# Patient Record
Sex: FEMALE | Race: WHITE | NOT HISPANIC OR LATINO | URBAN - METROPOLITAN AREA
[De-identification: names, ages, dates, MRNs, and addresses within clinical notes are randomized per-mention and may not be internally consistent; named-entity substitution may affect disease eponyms.]

---

## 2017-01-08 ENCOUNTER — INPATIENT (INPATIENT)
Facility: HOSPITAL | Age: 82
LOS: 3 days | Discharge: NURSING, REHAB OR LONG-TERM CARE | DRG: 516 | End: 2017-01-12
Attending: INTERNAL MEDICINE | Admitting: INTERNAL MEDICINE
Payer: MEDICARE

## 2017-01-08 VITALS
RESPIRATION RATE: 19 BRPM | SYSTOLIC BLOOD PRESSURE: 151 MMHG | WEIGHT: 156.97 LBS | DIASTOLIC BLOOD PRESSURE: 90 MMHG | TEMPERATURE: 98 F | HEART RATE: 61 BPM | OXYGEN SATURATION: 99 %

## 2017-01-08 DIAGNOSIS — Z29.9 ENCOUNTER FOR PROPHYLACTIC MEASURES, UNSPECIFIED: ICD-10-CM
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
## 2017-01-08 DIAGNOSIS — S82.002A UNSPECIFIED FRACTURE OF LEFT PATELLA, INITIAL ENCOUNTER FOR CLOSED FRACTURE: ICD-10-CM

## 2017-01-08 DIAGNOSIS — Z86.79 PERSONAL HISTORY OF OTHER DISEASES OF THE CIRCULATORY SYSTEM: ICD-10-CM

## 2017-01-08 DIAGNOSIS — E03.9 HYPOTHYROIDISM, UNSPECIFIED: ICD-10-CM

## 2017-01-08 DIAGNOSIS — S52.502A UNSPECIFIED FRACTURE OF THE LOWER END OF LEFT RADIUS, INITIAL ENCOUNTER FOR CLOSED FRACTURE: ICD-10-CM

## 2017-01-08 DIAGNOSIS — E78.00 PURE HYPERCHOLESTEROLEMIA, UNSPECIFIED: ICD-10-CM

## 2017-01-08 DIAGNOSIS — M25.562 PAIN IN LEFT KNEE: ICD-10-CM

## 2017-01-08 LAB
ALBUMIN SERPL ELPH-MCNC: 3.5 G/DL — SIGNIFICANT CHANGE UP (ref 3.5–5)
ALP SERPL-CCNC: 70 U/L — SIGNIFICANT CHANGE UP (ref 40–120)
ALT FLD-CCNC: 16 U/L DA — SIGNIFICANT CHANGE UP (ref 10–60)
ANION GAP SERPL CALC-SCNC: 10 MMOL/L — SIGNIFICANT CHANGE UP (ref 5–17)
APPEARANCE UR: CLEAR — SIGNIFICANT CHANGE UP
AST SERPL-CCNC: 13 U/L — SIGNIFICANT CHANGE UP (ref 10–40)
BASOPHILS # BLD AUTO: 0.1 K/UL — SIGNIFICANT CHANGE UP (ref 0–0.2)
BASOPHILS NFR BLD AUTO: 1.2 % — SIGNIFICANT CHANGE UP (ref 0–2)
BILIRUB SERPL-MCNC: 0.7 MG/DL — SIGNIFICANT CHANGE UP (ref 0.2–1.2)
BILIRUB UR-MCNC: NEGATIVE — SIGNIFICANT CHANGE UP
BUN SERPL-MCNC: 18 MG/DL — SIGNIFICANT CHANGE UP (ref 7–18)
CALCIUM SERPL-MCNC: 7.9 MG/DL — LOW (ref 8.4–10.5)
CHLORIDE SERPL-SCNC: 105 MMOL/L — SIGNIFICANT CHANGE UP (ref 96–108)
CO2 SERPL-SCNC: 28 MMOL/L — SIGNIFICANT CHANGE UP (ref 22–31)
COLOR SPEC: YELLOW — SIGNIFICANT CHANGE UP
CREAT SERPL-MCNC: 0.93 MG/DL — SIGNIFICANT CHANGE UP (ref 0.5–1.3)
DIFF PNL FLD: ABNORMAL
EOSINOPHIL # BLD AUTO: 0.2 K/UL — SIGNIFICANT CHANGE UP (ref 0–0.5)
EOSINOPHIL NFR BLD AUTO: 1.7 % — SIGNIFICANT CHANGE UP (ref 0–6)
GLUCOSE SERPL-MCNC: 109 MG/DL — HIGH (ref 70–99)
GLUCOSE UR QL: NEGATIVE — SIGNIFICANT CHANGE UP
HCT VFR BLD CALC: 38.6 % — SIGNIFICANT CHANGE UP (ref 34.5–45)
HGB BLD-MCNC: 12.7 G/DL — SIGNIFICANT CHANGE UP (ref 11.5–15.5)
KETONES UR-MCNC: NEGATIVE — SIGNIFICANT CHANGE UP
LEUKOCYTE ESTERASE UR-ACNC: ABNORMAL
LYMPHOCYTES # BLD AUTO: 1.3 K/UL — SIGNIFICANT CHANGE UP (ref 1–3.3)
LYMPHOCYTES # BLD AUTO: 14.3 % — SIGNIFICANT CHANGE UP (ref 13–44)
MCHC RBC-ENTMCNC: 29.1 PG — SIGNIFICANT CHANGE UP (ref 27–34)
MCHC RBC-ENTMCNC: 32.9 GM/DL — SIGNIFICANT CHANGE UP (ref 32–36)
MCV RBC AUTO: 88.5 FL — SIGNIFICANT CHANGE UP (ref 80–100)
MONOCYTES # BLD AUTO: 0.8 K/UL — SIGNIFICANT CHANGE UP (ref 0–0.9)
MONOCYTES NFR BLD AUTO: 8.3 % — SIGNIFICANT CHANGE UP (ref 2–14)
NEUTROPHILS # BLD AUTO: 6.8 K/UL — SIGNIFICANT CHANGE UP (ref 1.8–7.4)
NEUTROPHILS NFR BLD AUTO: 74.5 % — SIGNIFICANT CHANGE UP (ref 43–77)
NITRITE UR-MCNC: NEGATIVE — SIGNIFICANT CHANGE UP
PH UR: 6.5 — SIGNIFICANT CHANGE UP (ref 4.8–8)
PLATELET # BLD AUTO: 213 K/UL — SIGNIFICANT CHANGE UP (ref 150–400)
POTASSIUM SERPL-MCNC: 3.7 MMOL/L — SIGNIFICANT CHANGE UP (ref 3.5–5.3)
POTASSIUM SERPL-SCNC: 3.7 MMOL/L — SIGNIFICANT CHANGE UP (ref 3.5–5.3)
PROT SERPL-MCNC: 7.5 G/DL — SIGNIFICANT CHANGE UP (ref 6–8.3)
PROT UR-MCNC: 30 MG/DL
RBC # BLD: 4.36 M/UL — SIGNIFICANT CHANGE UP (ref 3.8–5.2)
RBC # FLD: 13.8 % — SIGNIFICANT CHANGE UP (ref 10.3–14.5)
SODIUM SERPL-SCNC: 143 MMOL/L — SIGNIFICANT CHANGE UP (ref 135–145)
SP GR SPEC: 1.01 — SIGNIFICANT CHANGE UP (ref 1.01–1.02)
UROBILINOGEN FLD QL: NEGATIVE — SIGNIFICANT CHANGE UP
WBC # BLD: 9.1 K/UL — SIGNIFICANT CHANGE UP (ref 3.8–10.5)
WBC # FLD AUTO: 9.1 K/UL — SIGNIFICANT CHANGE UP (ref 3.8–10.5)

## 2017-01-08 PROCEDURE — 73564 X-RAY EXAM KNEE 4 OR MORE: CPT | Mod: 26,LT

## 2017-01-08 PROCEDURE — 71010: CPT | Mod: 26

## 2017-01-08 PROCEDURE — 99285 EMERGENCY DEPT VISIT HI MDM: CPT | Mod: 25

## 2017-01-08 PROCEDURE — 73110 X-RAY EXAM OF WRIST: CPT | Mod: 26,LT

## 2017-01-08 PROCEDURE — 72170 X-RAY EXAM OF PELVIS: CPT | Mod: 26

## 2017-01-08 RX ORDER — METOPROLOL TARTRATE 50 MG
50 TABLET ORAL
Qty: 0 | Refills: 0 | COMMUNITY

## 2017-01-08 RX ORDER — MONTELUKAST 4 MG/1
0 TABLET, CHEWABLE ORAL
Qty: 0 | Refills: 0 | COMMUNITY

## 2017-01-08 RX ORDER — ALPRAZOLAM 0.25 MG
0 TABLET ORAL
Qty: 0 | Refills: 0 | COMMUNITY

## 2017-01-08 RX ORDER — ALPRAZOLAM 0.25 MG
0.5 TABLET ORAL
Qty: 0 | Refills: 0 | Status: DISCONTINUED | OUTPATIENT
Start: 2017-01-08 | End: 2017-01-08

## 2017-01-08 RX ORDER — LEVOTHYROXINE SODIUM 125 MCG
100 TABLET ORAL DAILY
Qty: 0 | Refills: 0 | Status: DISCONTINUED | OUTPATIENT
Start: 2017-01-08 | End: 2017-01-12

## 2017-01-08 RX ORDER — LISINOPRIL 2.5 MG/1
40 TABLET ORAL DAILY
Qty: 0 | Refills: 0 | Status: DISCONTINUED | OUTPATIENT
Start: 2017-01-08 | End: 2017-01-12

## 2017-01-08 RX ORDER — METOPROLOL TARTRATE 50 MG
0 TABLET ORAL
Qty: 0 | Refills: 0 | COMMUNITY

## 2017-01-08 RX ORDER — ENOXAPARIN SODIUM 100 MG/ML
40 INJECTION SUBCUTANEOUS DAILY
Qty: 0 | Refills: 0 | Status: DISCONTINUED | OUTPATIENT
Start: 2017-01-08 | End: 2017-01-12

## 2017-01-08 RX ORDER — ATORVASTATIN CALCIUM 80 MG/1
0 TABLET, FILM COATED ORAL
Qty: 0 | Refills: 0 | COMMUNITY

## 2017-01-08 RX ORDER — LEVOTHYROXINE SODIUM 125 MCG
0 TABLET ORAL
Qty: 0 | Refills: 0 | COMMUNITY

## 2017-01-08 RX ORDER — ALPRAZOLAM 0.25 MG
60 TABLET ORAL
Qty: 0 | Refills: 0 | COMMUNITY

## 2017-01-08 RX ORDER — ATORVASTATIN CALCIUM 80 MG/1
20 TABLET, FILM COATED ORAL AT BEDTIME
Qty: 0 | Refills: 0 | Status: DISCONTINUED | OUTPATIENT
Start: 2017-01-08 | End: 2017-01-12

## 2017-01-08 RX ORDER — ACETAMINOPHEN 500 MG
650 TABLET ORAL ONCE
Qty: 0 | Refills: 0 | Status: COMPLETED | OUTPATIENT
Start: 2017-01-08 | End: 2017-01-08

## 2017-01-08 RX ORDER — ACETAMINOPHEN 500 MG
650 TABLET ORAL EVERY 6 HOURS
Qty: 0 | Refills: 0 | Status: DISCONTINUED | OUTPATIENT
Start: 2017-01-08 | End: 2017-01-12

## 2017-01-08 RX ORDER — DEXTROSE MONOHYDRATE, SODIUM CHLORIDE, AND POTASSIUM CHLORIDE 50; .745; 4.5 G/1000ML; G/1000ML; G/1000ML
1000 INJECTION, SOLUTION INTRAVENOUS
Qty: 0 | Refills: 0 | Status: DISCONTINUED | OUTPATIENT
Start: 2017-01-08 | End: 2017-01-12

## 2017-01-08 RX ADMIN — Medication 100 MICROGRAM(S): at 18:27

## 2017-01-08 RX ADMIN — LISINOPRIL 40 MILLIGRAM(S): 2.5 TABLET ORAL at 18:27

## 2017-01-08 RX ADMIN — Medication 0.5 MILLIGRAM(S): at 18:27

## 2017-01-08 RX ADMIN — Medication 0.5 MILLIGRAM(S): at 22:13

## 2017-01-08 RX ADMIN — Medication 650 MILLIGRAM(S): at 06:48

## 2017-01-08 RX ADMIN — ATORVASTATIN CALCIUM 20 MILLIGRAM(S): 80 TABLET, FILM COATED ORAL at 22:13

## 2017-01-08 RX ADMIN — ENOXAPARIN SODIUM 40 MILLIGRAM(S): 100 INJECTION SUBCUTANEOUS at 13:11

## 2017-01-08 NOTE — ED ADULT NURSE NOTE - OBJECTIVE STATEMENT
patient is a 82y Female complaining of fall. Pt is alert and oriented x 3, denies allergies to food or medication.

## 2017-01-08 NOTE — ED PROVIDER NOTE - MEDICAL DECISION MAKING DETAILS
83 yo F with mech slip and fall on snow 12 hours prior to arrival now with left wrist and knee pain.   Will obtain imaging, provide meds and reassess. Will most likely need admission and PT.   PMD Dr Naranjo. 83 [facility] to arrival now with left wrist and knee pain.   Will obtain imaging, provide meds and reassess. Will most likely need admission and PT.   Distal Rad Fx Left and Patellar Fx left. Will admit to Dr Naranjo Service.   PMD Dr Naranjo.

## 2017-01-08 NOTE — H&P ADULT. - PROBLEM SELECTOR PLAN 2
- will continue pain management   - continue fall precautions   - f/u orthopedic evaluation, Dr Moreira - will continue pain management   - continue fall precautions   - f/u orthopedic evaluation, Dr Moreira  - ORIF recommended

## 2017-01-08 NOTE — ED PROVIDER NOTE - CARE PLAN
Principal Discharge DX:	Fall, initial encounter  Secondary Diagnosis:	Wrist pain, acute, left  Secondary Diagnosis:	Acute pain of left knee

## 2017-01-08 NOTE — ED PROVIDER NOTE - DIAGNOSIS COUNSELING, MDM
conducted a detailed discussion... I had a detailed discussion with the patient regarding the historical points, exam findings, and any diagnostic results supporting the diagnosis.

## 2017-01-08 NOTE — H&P ADULT. - HISTORY OF PRESENT ILLNESS
83 yo F with HTN, HLP that lives at home that presents with fall 12 hours prior to arrival while shoveling snow falling on her left side, no head trauma, no LOC. Denies dizziness or chest pain prior to incident. Reports she was attempting to clear some snow from her sidewalk when slip and fell onto left side. Did not come to ED immediately thinking it was going to be okay, ACE wrap applied by her. Since has not taken meds but noticed pain and swelling to left wrist and left knee and inability to bear weight secondary to pain. Denies paresthesias, incontinence, pelvic or back pain. No SON, chest pain, abd pain. No other complaints. Patient is 81 yo F, lives alone, independent at baseline,  with HTN, HLP, hypothyroidism and anxiety presented with a fall, after trying to shovel snow.   Patient comes with fall 12 hours prior to arrival while shoveling snow falling on her left side, no head trauma, no LOC. Denies dizziness or chest pain prior to incident. Reports she was attempting to clear some snow from her sidewalk when slip and fell onto left side. Did not come to ED immediately thinking it was going to be okay, ACE wrap applied by her. Since has not taken meds but noticed pain and swelling to left wrist and left knee and inability to bear weight secondary to pain. Denies paresthesias, incontinence, pelvic or back pain. No SON, chest pain, abd pain. No other complaints. She has a flu shot this season and denies any URIs, chest pain, N/V/D or abdominal pain.     Social: lives alone.  and Son are both no more. Nephew Gifty is the HCP.  Never smoked, took alcohol or used illicit drugs.   Family: father had a stroke at 45 years of age.

## 2017-01-08 NOTE — ED PROVIDER NOTE - OBJECTIVE STATEMENT
83 yo F with HTN, HLP that lives at home that presents with fall 12 hours prior to arrival while shoveling snow falling on her left side, no head trauma, no LOC. Denies dizziness or chest pain prior to incident. Reports she was attempting to clear some snow from her sidewalk when slip and fell onto left side. Did not come to ED immediately thinking it was going to be okay, ACE wrap applied by her. Since has not taken meds but noticed pain and swelling to left wrist and left knee and inability to bear weight secondary to pain. Denies paresthesias, incontinence, pelvic or back pain. No SON, chest pain, abd pain. No other complaints.  PMD Dr Nraanjo

## 2017-01-08 NOTE — H&P ADULT. - ASSESSMENT
Patient is 83 yo F, lives alone, independent at baseline,  with HTN, HLP, hypothyroidism and anxiety presented with a fall, after trying to shovel snow.

## 2017-01-08 NOTE — ED PROVIDER NOTE - PHYSICAL EXAMINATION
GENERAL: No acute distress, non toxic  HEAD: Atraumatic, normocephalic  EARS: Externally normal, atraumatic, TMs normal bilaterally  EYES: No jaundice, not injected, no rupture, no foreign bodies  MOUTH: Moist mucous membranes, no open lesion, uvula midline without edema, no exudates, no peritonsilar abscess bilaterally.  NECK: Supple, full range of motion, no swelling, no lymphadenopathy  HEART: Regular rate and rhythm, no murmurs, no rubs, no gallops  LUNGS: Clear to auscultation bilaterally without rhonci, rales, or wheezing  ABDOMEN: Soft and non tender in all 4 quadrants, normal bowel sounds, no signs of trauma, no costovertebral tenderness bilaterally  BACK/SPINE: Non tender spine in cervical/thoracic/lumbar regions, no stepoffs palpable, pelvis stable to axial loading.   EXTREMITIES: left wrist with edema and ecchymosis, no open wounds, grossly deformed. Pulse palpable, cap refill < 2 secs. Left knee with swelling and tenderness to palpation medially, no open wounds pulses palpable DP and pop.   VASCULAR: Pulses palpable in all extremities, no pitting edema, capillary refill <2 secs  PSYCH: Alert and oriented x 3

## 2017-01-08 NOTE — H&P ADULT. - PROBLEM SELECTOR PLAN 1
-Patient has a history of falling without syncope, near-syncope, seizure-like activity, and no palpitations, diaphoresis, headaches, or visual disturbance prior to or after falling. He has comfortable foot wear and says her apartment has no slippery  floors.    - Most likely mechanical in nature, as patient is advised to ambulate at home with her walker; she does not always ambulate with her walker as she think her "mind runs ahead of her body"    - Consider hypoglycaemia-induced, as patient's FSBS low, 80 and patient feels weak with bilateral hand tremours and nausea after falling. Perhaps patient has episode of hypoglycaemia that have not been picked up upon FSBS monitoring.    - No significant evidence to indicate that falls are secondary to infection or dementia/delirium.    - Patient has had previous EKGs, however she has had no further cardiac evaluation, as per patient.    - No telemetry required upon this admission, as per attending (at this time), however f/u EKG for evidence of cardiac arrhythmias.    - F/u vitamin D-25(OH)2, vitamin B12, HgA1c, TSH and PT evaluation.    - F/u CPK level for concern for rhabdomyolysis  - No evidence of active bleedingPatient -Patient has a fall without syncope, near-syncope, seizure-like activity, and no palpitations, diaphoresis, headaches, or visual disturbance prior to or after falling. He has comfortable foot wear and says her apartment has no slippery  floors.  - Most likely mechanical in nature, whole shovelling in snow   - F/u vitamin D-25(OH)2, vitamin B12, HgA1c, TSH and PT evaluation.  - F/u CPK level for concern for rhabdomyolysis  - No evidence of active bleeding  - X ray wrist shows Comminuted distal left radial intra-articular fracture   deformity while X ray of the knee shows patellar fracture   - patient's pain is controlled on Tylenol   - will continue pain management   - continue fall precautions   - f/u orthopedic evaluation, Dr Moreira -Patient has a fall without syncope, near-syncope, seizure-like activity, and no palpitations, diaphoresis, headaches, or visual disturbance prior to or after falling. He has comfortable foot wear and says her apartment has no slippery  floors.  - Most likely mechanical in nature, whole shovelling in snow   - F/u vitamin D-25(OH)2, vitamin B12, HgA1c, TSH and PT evaluation.  - F/u CPK level for concern for rhabdomyolysis  - No evidence of active bleeding  - X ray wrist shows Comminuted distal left radial intra-articular fracture   deformity while X ray of the knee shows patellar fracture   - patient's pain is controlled on Tylenol   - will continue pain management   - continue fall precautions   - f/u orthopedic evaluation, Dr Moreira  ADDENDUM: patient would require ORIF of patella, hence Dr Ramirez was informed for cardiac clearance, Procedure likely tomorrow. NO coffee, peas, etc in am for possible stress test. -Patient has a fall without syncope, near-syncope, seizure-like activity, and no palpitations, diaphoresis, headaches, or visual disturbance prior to or after falling. He has comfortable foot wear and says her apartment has no slippery  floors.  - Most likely mechanical in nature, whole shovelling in snow   - F/u vitamin D-25(OH)2, vitamin B12, HgA1c, TSH and PT evaluation.  - F/u CPK level for concern for rhabdomyolysis  - No evidence of active bleeding  - X ray wrist shows Comminuted distal left radial intra-articular fracture   deformity while X ray of the knee shows patellar fracture   - patient's pain is controlled on Tylenol   - will continue pain management   - continue fall precautions   - f/u orthopedic evaluation, Dr Moreira  ADDENDUM: patient would require ORIF of patella, hence Dr Ramirez was informed for cardiac clearance, Procedure likely tomorrow. Stress test in am.

## 2017-01-08 NOTE — H&P ADULT. - PROBLEM SELECTOR PLAN 3
- will continue pain management   - continue fall precautions   - f/u orthopedic evaluation, Dr Moreira - will continue pain management   - continue fall precautions   - f/u orthopedic evaluation, Dr Moreira  - plaster and PT

## 2017-01-08 NOTE — H&P ADULT. - PROBLEM SELECTOR PLAN 4
- will continue pain management   - continue fall precautions   - f/u orthopedic evaluation, Dr Moreira- will continue pain management   - continue fall precautions   - f/u orthopedic evaluation, Dr Moreira

## 2017-01-09 LAB
24R-OH-CALCIDIOL SERPL-MCNC: 40.9 NG/ML — SIGNIFICANT CHANGE UP (ref 30–100)
ALBUMIN SERPL ELPH-MCNC: 3 G/DL — LOW (ref 3.5–5)
ALP SERPL-CCNC: 64 U/L — SIGNIFICANT CHANGE UP (ref 40–120)
ALT FLD-CCNC: 12 U/L DA — SIGNIFICANT CHANGE UP (ref 10–60)
ANION GAP SERPL CALC-SCNC: 10 MMOL/L — SIGNIFICANT CHANGE UP (ref 5–17)
AST SERPL-CCNC: 12 U/L — SIGNIFICANT CHANGE UP (ref 10–40)
BASOPHILS # BLD AUTO: 0.1 K/UL — SIGNIFICANT CHANGE UP (ref 0–0.2)
BASOPHILS NFR BLD AUTO: 1.1 % — SIGNIFICANT CHANGE UP (ref 0–2)
BILIRUB SERPL-MCNC: 0.8 MG/DL — SIGNIFICANT CHANGE UP (ref 0.2–1.2)
BUN SERPL-MCNC: 13 MG/DL — SIGNIFICANT CHANGE UP (ref 7–18)
CALCIUM SERPL-MCNC: 6.8 MG/DL — LOW (ref 8.4–10.5)
CHLORIDE SERPL-SCNC: 107 MMOL/L — SIGNIFICANT CHANGE UP (ref 96–108)
CHOLEST SERPL-MCNC: 170 MG/DL — SIGNIFICANT CHANGE UP (ref 10–199)
CO2 SERPL-SCNC: 28 MMOL/L — SIGNIFICANT CHANGE UP (ref 22–31)
CREAT SERPL-MCNC: 0.75 MG/DL — SIGNIFICANT CHANGE UP (ref 0.5–1.3)
EOSINOPHIL # BLD AUTO: 0.3 K/UL — SIGNIFICANT CHANGE UP (ref 0–0.5)
EOSINOPHIL NFR BLD AUTO: 3.1 % — SIGNIFICANT CHANGE UP (ref 0–6)
GLUCOSE SERPL-MCNC: 113 MG/DL — HIGH (ref 70–99)
HBA1C BLD-MCNC: 6.2 % — HIGH (ref 4–5.6)
HCT VFR BLD CALC: 36.6 % — SIGNIFICANT CHANGE UP (ref 34.5–45)
HDLC SERPL-MCNC: 93 MG/DL — SIGNIFICANT CHANGE UP (ref 40–125)
HGB BLD-MCNC: 12 G/DL — SIGNIFICANT CHANGE UP (ref 11.5–15.5)
LIPID PNL WITH DIRECT LDL SERPL: 62 MG/DL — SIGNIFICANT CHANGE UP
LYMPHOCYTES # BLD AUTO: 1.5 K/UL — SIGNIFICANT CHANGE UP (ref 1–3.3)
LYMPHOCYTES # BLD AUTO: 18 % — SIGNIFICANT CHANGE UP (ref 13–44)
MAGNESIUM SERPL-MCNC: 2.2 MG/DL — SIGNIFICANT CHANGE UP (ref 1.8–2.4)
MCHC RBC-ENTMCNC: 28.3 PG — SIGNIFICANT CHANGE UP (ref 27–34)
MCHC RBC-ENTMCNC: 32.7 GM/DL — SIGNIFICANT CHANGE UP (ref 32–36)
MCV RBC AUTO: 86.7 FL — SIGNIFICANT CHANGE UP (ref 80–100)
MONOCYTES # BLD AUTO: 0.8 K/UL — SIGNIFICANT CHANGE UP (ref 0–0.9)
MONOCYTES NFR BLD AUTO: 9.8 % — SIGNIFICANT CHANGE UP (ref 2–14)
NEUTROPHILS # BLD AUTO: 5.5 K/UL — SIGNIFICANT CHANGE UP (ref 1.8–7.4)
NEUTROPHILS NFR BLD AUTO: 67.9 % — SIGNIFICANT CHANGE UP (ref 43–77)
PHOSPHATE SERPL-MCNC: 5 MG/DL — HIGH (ref 2.5–4.5)
PLATELET # BLD AUTO: 189 K/UL — SIGNIFICANT CHANGE UP (ref 150–400)
POTASSIUM SERPL-MCNC: 3.5 MMOL/L — SIGNIFICANT CHANGE UP (ref 3.5–5.3)
POTASSIUM SERPL-SCNC: 3.5 MMOL/L — SIGNIFICANT CHANGE UP (ref 3.5–5.3)
PROT SERPL-MCNC: 6.9 G/DL — SIGNIFICANT CHANGE UP (ref 6–8.3)
RBC # BLD: 4.22 M/UL — SIGNIFICANT CHANGE UP (ref 3.8–5.2)
RBC # FLD: 13.5 % — SIGNIFICANT CHANGE UP (ref 10.3–14.5)
SODIUM SERPL-SCNC: 145 MMOL/L — SIGNIFICANT CHANGE UP (ref 135–145)
TOTAL CHOLESTEROL/HDL RATIO MEASUREMENT: 1.8 RATIO — LOW (ref 3.3–7.1)
TRIGL SERPL-MCNC: 75 MG/DL — SIGNIFICANT CHANGE UP (ref 10–149)
TSH SERPL-MCNC: 1.59 UU/ML — SIGNIFICANT CHANGE UP (ref 0.34–4.82)
VIT B12 SERPL-MCNC: 904 PG/ML — HIGH (ref 243–894)
WBC # BLD: 8.1 K/UL — SIGNIFICANT CHANGE UP (ref 3.8–10.5)
WBC # FLD AUTO: 8.1 K/UL — SIGNIFICANT CHANGE UP (ref 3.8–10.5)

## 2017-01-09 PROCEDURE — 27524 TREAT KNEECAP FRACTURE: CPT | Mod: AS,LT

## 2017-01-09 RX ORDER — KETOROLAC TROMETHAMINE 30 MG/ML
15 SYRINGE (ML) INJECTION EVERY 6 HOURS
Qty: 0 | Refills: 0 | Status: DISCONTINUED | OUTPATIENT
Start: 2017-01-09 | End: 2017-01-11

## 2017-01-09 RX ORDER — SODIUM CHLORIDE 9 MG/ML
1000 INJECTION, SOLUTION INTRAVENOUS
Qty: 0 | Refills: 0 | Status: DISCONTINUED | OUTPATIENT
Start: 2017-01-09 | End: 2017-01-12

## 2017-01-09 RX ORDER — FENTANYL CITRATE 50 UG/ML
25 INJECTION INTRAVENOUS
Qty: 0 | Refills: 0 | Status: DISCONTINUED | OUTPATIENT
Start: 2017-01-09 | End: 2017-01-09

## 2017-01-09 RX ORDER — METOPROLOL TARTRATE 50 MG
25 TABLET ORAL
Qty: 0 | Refills: 0 | Status: DISCONTINUED | OUTPATIENT
Start: 2017-01-09 | End: 2017-01-12

## 2017-01-09 RX ORDER — SODIUM CHLORIDE 9 MG/ML
1000 INJECTION, SOLUTION INTRAVENOUS
Qty: 0 | Refills: 0 | Status: DISCONTINUED | OUTPATIENT
Start: 2017-01-09 | End: 2017-01-09

## 2017-01-09 RX ORDER — CEFAZOLIN SODIUM 1 G
1000 VIAL (EA) INJECTION EVERY 8 HOURS
Qty: 0 | Refills: 0 | Status: COMPLETED | OUTPATIENT
Start: 2017-01-09 | End: 2017-01-10

## 2017-01-09 RX ORDER — ACETAMINOPHEN 500 MG
1000 TABLET ORAL ONCE
Qty: 0 | Refills: 0 | Status: COMPLETED | OUTPATIENT
Start: 2017-01-09 | End: 2017-01-09

## 2017-01-09 RX ADMIN — Medication 400 MILLIGRAM(S): at 21:35

## 2017-01-09 RX ADMIN — Medication 1000 MILLIGRAM(S): at 21:40

## 2017-01-09 RX ADMIN — LISINOPRIL 40 MILLIGRAM(S): 2.5 TABLET ORAL at 06:04

## 2017-01-09 RX ADMIN — ATORVASTATIN CALCIUM 20 MILLIGRAM(S): 80 TABLET, FILM COATED ORAL at 21:30

## 2017-01-09 RX ADMIN — Medication 0.5 MILLIGRAM(S): at 23:13

## 2017-01-09 RX ADMIN — Medication 100 MILLIGRAM(S): at 21:29

## 2017-01-09 RX ADMIN — DEXTROSE MONOHYDRATE, SODIUM CHLORIDE, AND POTASSIUM CHLORIDE 75 MILLILITER(S): 50; .745; 4.5 INJECTION, SOLUTION INTRAVENOUS at 06:05

## 2017-01-09 RX ADMIN — Medication 25 MILLIGRAM(S): at 21:30

## 2017-01-09 RX ADMIN — Medication 100 MICROGRAM(S): at 06:04

## 2017-01-10 LAB
HCT VFR BLD CALC: 30.7 % — LOW (ref 34.5–45)
HGB BLD-MCNC: 10.1 G/DL — LOW (ref 11.5–15.5)
MCHC RBC-ENTMCNC: 29.3 PG — SIGNIFICANT CHANGE UP (ref 27–34)
MCHC RBC-ENTMCNC: 32.8 GM/DL — SIGNIFICANT CHANGE UP (ref 32–36)
MCV RBC AUTO: 89.2 FL — SIGNIFICANT CHANGE UP (ref 80–100)
PLATELET # BLD AUTO: 168 K/UL — SIGNIFICANT CHANGE UP (ref 150–400)
RBC # BLD: 3.44 M/UL — LOW (ref 3.8–5.2)
RBC # FLD: 14 % — SIGNIFICANT CHANGE UP (ref 10.3–14.5)
WBC # BLD: 10 K/UL — SIGNIFICANT CHANGE UP (ref 3.8–10.5)
WBC # FLD AUTO: 10 K/UL — SIGNIFICANT CHANGE UP (ref 3.8–10.5)

## 2017-01-10 RX ADMIN — Medication 100 MILLIGRAM(S): at 06:53

## 2017-01-10 RX ADMIN — Medication 25 MILLIGRAM(S): at 17:22

## 2017-01-10 RX ADMIN — ENOXAPARIN SODIUM 40 MILLIGRAM(S): 100 INJECTION SUBCUTANEOUS at 11:47

## 2017-01-10 RX ADMIN — Medication 25 MILLIGRAM(S): at 06:54

## 2017-01-10 RX ADMIN — Medication 15 MILLIGRAM(S): at 06:31

## 2017-01-10 RX ADMIN — ATORVASTATIN CALCIUM 20 MILLIGRAM(S): 80 TABLET, FILM COATED ORAL at 21:22

## 2017-01-10 RX ADMIN — Medication 15 MILLIGRAM(S): at 23:00

## 2017-01-10 RX ADMIN — Medication 0.5 MILLIGRAM(S): at 13:52

## 2017-01-10 RX ADMIN — LISINOPRIL 40 MILLIGRAM(S): 2.5 TABLET ORAL at 06:54

## 2017-01-10 RX ADMIN — Medication 15 MILLIGRAM(S): at 06:16

## 2017-01-10 RX ADMIN — Medication 100 MICROGRAM(S): at 06:53

## 2017-01-10 RX ADMIN — Medication 15 MILLIGRAM(S): at 22:26

## 2017-01-10 NOTE — PHYSICAL THERAPY INITIAL EVALUATION ADULT - LIVES WITH, PROFILE
alone/lives alone in house with 11 steps outside to enter as per pt. Pt. reports eating in basement and having bedroom upstairs. lives alone in house with 11 steps outside to enter. Pt. reports eating in basement and having bedroom upstairs./alone

## 2017-01-10 NOTE — PHYSICAL THERAPY INITIAL EVALUATION ADULT - PERTINENT HX OF CURRENT PROBLEM, REHAB EVAL
fall w/fracture of L patella and L distal radius fall w/fracture of L patella and ORIF and L distal radius

## 2017-01-10 NOTE — PHYSICAL THERAPY INITIAL EVALUATION ADULT - NS ASR WT BEARING DETAIL LUE
nonweight-bearing Ortho PA advised not to use platform walker at this time secondary to risk of lt. radius fx. displacement/nonweight-bearing

## 2017-01-10 NOTE — PHYSICAL THERAPY INITIAL EVALUATION ADULT - PASSIVE RANGE OF MOTION EXAMINATION, REHAB EVAL
LUE shoulder to ~90 deg flx. Lt. hip WFL.  lt. knee not assessed as braced. Lt. ankle WFL/Right UE Passive ROM was WFL (within functional limits)/Right LE Passive ROM was WFL (within functional limits)

## 2017-01-10 NOTE — PHYSICAL THERAPY INITIAL EVALUATION ADULT - TRANSFER SAFETY CONCERNS NOTED: SIT/STAND, REHAB EVAL
decreased step length/decreased balance during turns/decreased weight-shifting ability/inability to maintain weight-bearing restrictions w/o assist

## 2017-01-10 NOTE — PHYSICAL THERAPY INITIAL EVALUATION ADULT - GENERAL OBSERVATIONS, REHAB EVAL
Consult received, chart reviewed. Patient received supine in bed, NAD, + tele, +IV. Pt. L knee in ace wrap locked in ext. w/knee immobilizer. Pt. L wrist/elbow in ace wrap with olecranon splint. Patient agreed to EVALUATION from Physical Therapist.

## 2017-01-10 NOTE — PHYSICAL THERAPY INITIAL EVALUATION ADULT - ACTIVE RANGE OF MOTION EXAMINATION, REHAB EVAL
AROM L shoulder to 90 degrees, elbow ext/flex limited secondary to splint, wrist ROM not tested.  L LE AROM not assessed secondary to being in knee immobilizer locked in knee ext. except L ankle AROM which is WFL

## 2017-01-10 NOTE — PHYSICAL THERAPY INITIAL EVALUATION ADULT - MANUAL MUSCLE TESTING RESULTS, REHAB EVAL
R UE grossly 5/5, R LE grossly 5/5 except for hip flex 4/5. L shoulder 3/5, L elbow and L wrist not assessed secondary to being in ace wrap and splint with NWB precautions. L hip 3/5, L knee not assessed secondary to being in knee immobilizer, L ankle 5/5/grossly assessed due to grossly assessed due to/R UE grossly 5/5, R LE grossly 5/5 except for hip flex 4/5. L shoulder 3-/5, L elbow and L wrist not assessed secondary to being in ace wrap and splint with NWB precautions. L hip 3/5, L knee not assessed secondary to being in knee immobilizer, L ankle 5/5

## 2017-01-10 NOTE — PHYSICAL THERAPY INITIAL EVALUATION ADULT - CRITERIA FOR SKILLED THERAPEUTIC INTERVENTIONS
therapy frequency/predicted duration of therapy intervention/rehab potential/impairments found/anticipated discharge recommendation/functional limitations in following categories/risk reduction/prevention

## 2017-01-10 NOTE — PHYSICAL THERAPY INITIAL EVALUATION ADULT - LEVEL OF INDEPENDENCE: STAIR NEGOTIATION, REHAB EVAL
Not assessed at this time secondary to severe physical impairment which would place pt. at risk for fall. To be attempted as pt. appropriate

## 2017-01-11 LAB
ANION GAP SERPL CALC-SCNC: 9 MMOL/L — SIGNIFICANT CHANGE UP (ref 5–17)
BUN SERPL-MCNC: 11 MG/DL — SIGNIFICANT CHANGE UP (ref 7–18)
CALCIUM SERPL-MCNC: 6.2 MG/DL — CRITICAL LOW (ref 8.4–10.5)
CHLORIDE SERPL-SCNC: 107 MMOL/L — SIGNIFICANT CHANGE UP (ref 96–108)
CO2 SERPL-SCNC: 28 MMOL/L — SIGNIFICANT CHANGE UP (ref 22–31)
CREAT SERPL-MCNC: 0.74 MG/DL — SIGNIFICANT CHANGE UP (ref 0.5–1.3)
GLUCOSE SERPL-MCNC: 143 MG/DL — HIGH (ref 70–99)
HCT VFR BLD CALC: 31 % — LOW (ref 34.5–45)
HGB BLD-MCNC: 10 G/DL — LOW (ref 11.5–15.5)
MCHC RBC-ENTMCNC: 28.3 PG — SIGNIFICANT CHANGE UP (ref 27–34)
MCHC RBC-ENTMCNC: 32.3 GM/DL — SIGNIFICANT CHANGE UP (ref 32–36)
MCV RBC AUTO: 87.6 FL — SIGNIFICANT CHANGE UP (ref 80–100)
PLATELET # BLD AUTO: 193 K/UL — SIGNIFICANT CHANGE UP (ref 150–400)
POTASSIUM SERPL-MCNC: 3.4 MMOL/L — LOW (ref 3.5–5.3)
POTASSIUM SERPL-SCNC: 3.4 MMOL/L — LOW (ref 3.5–5.3)
RBC # BLD: 3.54 M/UL — LOW (ref 3.8–5.2)
RBC # FLD: 12.8 % — SIGNIFICANT CHANGE UP (ref 10.3–14.5)
SODIUM SERPL-SCNC: 144 MMOL/L — SIGNIFICANT CHANGE UP (ref 135–145)
WBC # BLD: 8.5 K/UL — SIGNIFICANT CHANGE UP (ref 3.8–10.5)
WBC # FLD AUTO: 8.5 K/UL — SIGNIFICANT CHANGE UP (ref 3.8–10.5)

## 2017-01-11 PROCEDURE — 73100 X-RAY EXAM OF WRIST: CPT | Mod: 26,LT

## 2017-01-11 RX ORDER — POTASSIUM CHLORIDE 20 MEQ
40 PACKET (EA) ORAL EVERY 4 HOURS
Qty: 0 | Refills: 0 | Status: COMPLETED | OUTPATIENT
Start: 2017-01-11 | End: 2017-01-11

## 2017-01-11 RX ORDER — POTASSIUM CHLORIDE 20 MEQ
40 PACKET (EA) ORAL ONCE
Qty: 0 | Refills: 0 | Status: COMPLETED | OUTPATIENT
Start: 2017-01-11 | End: 2017-01-11

## 2017-01-11 RX ORDER — TRAMADOL HYDROCHLORIDE 50 MG/1
50 TABLET ORAL EVERY 6 HOURS
Qty: 0 | Refills: 0 | Status: DISCONTINUED | OUTPATIENT
Start: 2017-01-11 | End: 2017-01-12

## 2017-01-11 RX ORDER — CALCIUM GLUCONATE 100 MG/ML
1 VIAL (ML) INTRAVENOUS ONCE
Qty: 0 | Refills: 0 | Status: COMPLETED | OUTPATIENT
Start: 2017-01-11 | End: 2017-01-11

## 2017-01-11 RX ADMIN — TRAMADOL HYDROCHLORIDE 50 MILLIGRAM(S): 50 TABLET ORAL at 18:57

## 2017-01-11 RX ADMIN — TRAMADOL HYDROCHLORIDE 50 MILLIGRAM(S): 50 TABLET ORAL at 17:57

## 2017-01-11 RX ADMIN — Medication 40 MILLIEQUIVALENT(S): at 14:07

## 2017-01-11 RX ADMIN — Medication 25 MILLIGRAM(S): at 17:56

## 2017-01-11 RX ADMIN — TRAMADOL HYDROCHLORIDE 50 MILLIGRAM(S): 50 TABLET ORAL at 12:13

## 2017-01-11 RX ADMIN — Medication 0.5 MILLIGRAM(S): at 01:28

## 2017-01-11 RX ADMIN — LISINOPRIL 40 MILLIGRAM(S): 2.5 TABLET ORAL at 06:01

## 2017-01-11 RX ADMIN — Medication 15 MILLIGRAM(S): at 06:32

## 2017-01-11 RX ADMIN — ATORVASTATIN CALCIUM 20 MILLIGRAM(S): 80 TABLET, FILM COATED ORAL at 21:33

## 2017-01-11 RX ADMIN — Medication 40 MILLIEQUIVALENT(S): at 11:13

## 2017-01-11 RX ADMIN — ENOXAPARIN SODIUM 40 MILLIGRAM(S): 100 INJECTION SUBCUTANEOUS at 11:13

## 2017-01-11 RX ADMIN — Medication 100 MICROGRAM(S): at 06:02

## 2017-01-11 RX ADMIN — Medication 25 MILLIGRAM(S): at 06:01

## 2017-01-11 RX ADMIN — Medication 15 MILLIGRAM(S): at 06:02

## 2017-01-11 RX ADMIN — TRAMADOL HYDROCHLORIDE 50 MILLIGRAM(S): 50 TABLET ORAL at 11:13

## 2017-01-11 RX ADMIN — Medication 0.5 MILLIGRAM(S): at 21:33

## 2017-01-11 RX ADMIN — Medication 40 MILLIEQUIVALENT(S): at 14:06

## 2017-01-11 RX ADMIN — Medication 200 GRAM(S): at 11:13

## 2017-01-11 RX ADMIN — Medication 1 TABLET(S): at 17:56

## 2017-01-12 VITALS
OXYGEN SATURATION: 98 % | HEART RATE: 69 BPM | TEMPERATURE: 99 F | SYSTOLIC BLOOD PRESSURE: 152 MMHG | DIASTOLIC BLOOD PRESSURE: 74 MMHG | RESPIRATION RATE: 16 BRPM

## 2017-01-12 PROCEDURE — 93306 TTE W/DOPPLER COMPLETE: CPT

## 2017-01-12 PROCEDURE — 80053 COMPREHEN METABOLIC PANEL: CPT

## 2017-01-12 PROCEDURE — 84100 ASSAY OF PHOSPHORUS: CPT

## 2017-01-12 PROCEDURE — 76000 FLUOROSCOPY <1 HR PHYS/QHP: CPT

## 2017-01-12 PROCEDURE — 82306 VITAMIN D 25 HYDROXY: CPT

## 2017-01-12 PROCEDURE — 99285 EMERGENCY DEPT VISIT HI MDM: CPT | Mod: 25

## 2017-01-12 PROCEDURE — 97162 PT EVAL MOD COMPLEX 30 MIN: CPT

## 2017-01-12 PROCEDURE — 73564 X-RAY EXAM KNEE 4 OR MORE: CPT

## 2017-01-12 PROCEDURE — 71045 X-RAY EXAM CHEST 1 VIEW: CPT

## 2017-01-12 PROCEDURE — 93005 ELECTROCARDIOGRAM TRACING: CPT

## 2017-01-12 PROCEDURE — 73100 X-RAY EXAM OF WRIST: CPT

## 2017-01-12 PROCEDURE — 73110 X-RAY EXAM OF WRIST: CPT

## 2017-01-12 PROCEDURE — 81001 URINALYSIS AUTO W/SCOPE: CPT

## 2017-01-12 PROCEDURE — 84443 ASSAY THYROID STIM HORMONE: CPT

## 2017-01-12 PROCEDURE — 78452 HT MUSCLE IMAGE SPECT MULT: CPT

## 2017-01-12 PROCEDURE — A9502: CPT

## 2017-01-12 PROCEDURE — 82607 VITAMIN B-12: CPT

## 2017-01-12 PROCEDURE — 72170 X-RAY EXAM OF PELVIS: CPT

## 2017-01-12 PROCEDURE — 93017 CV STRESS TEST TRACING ONLY: CPT

## 2017-01-12 PROCEDURE — 83735 ASSAY OF MAGNESIUM: CPT

## 2017-01-12 PROCEDURE — 85027 COMPLETE CBC AUTOMATED: CPT

## 2017-01-12 PROCEDURE — C1713: CPT

## 2017-01-12 PROCEDURE — 80048 BASIC METABOLIC PNL TOTAL CA: CPT

## 2017-01-12 PROCEDURE — 83036 HEMOGLOBIN GLYCOSYLATED A1C: CPT

## 2017-01-12 PROCEDURE — 80061 LIPID PANEL: CPT

## 2017-01-12 RX ORDER — TRAMADOL HYDROCHLORIDE 50 MG/1
1 TABLET ORAL
Qty: 0 | Refills: 0 | COMMUNITY
Start: 2017-01-12

## 2017-01-12 RX ADMIN — Medication 1 TABLET(S): at 06:20

## 2017-01-12 RX ADMIN — Medication 0.5 MILLIGRAM(S): at 10:15

## 2017-01-12 RX ADMIN — Medication 25 MILLIGRAM(S): at 06:20

## 2017-01-12 RX ADMIN — Medication 100 MICROGRAM(S): at 06:20

## 2017-01-12 RX ADMIN — ENOXAPARIN SODIUM 40 MILLIGRAM(S): 100 INJECTION SUBCUTANEOUS at 11:45

## 2017-01-12 RX ADMIN — LISINOPRIL 40 MILLIGRAM(S): 2.5 TABLET ORAL at 06:20

## 2017-01-12 NOTE — DISCHARGE NOTE ADULT - HOSPITAL COURSE
Patient comes with fall 12 hours prior to arrival while shoveling snow falling on her left side, no head trauma, no LOC. Denies dizziness or chest pain prior to incident. Reports she was attempting to clear some snow from her sidewalk when slip and fell onto left side. Did not come to ED immediately thinking it was going to be okay, ACE wrap applied by her. Since has not taken meds but noticed pain and swelling to left wrist and left knee and inability to bear weight secondary to pain. Denies paresthesias, incontinence, pelvic or back pain. No SON, chest pain, abd pain. No other complaints. She has a flu shot this season and denies any URIs, chest pain, N/V/D or abdominal pain.     Patient was noted to have patella fracture and distal radius fracture. Patient underwent L ORIF of patella and closed reduction of radius fracture. Patient tolerated procedure well. Patient seen by PT recommended for rehab. Patient for discharge with follow up with Dr. Fish within 2 weeks

## 2017-01-12 NOTE — DISCHARGE NOTE ADULT - PLAN OF CARE
NWB L upper extremity Please follow up with Dr. Moreira within 2 week   NWB L Upper extremity WBAT, ambulation Please follow up with Dr. Moreira within 2 week  staples to be removed by 1/25 Please continue current management and follow up with PCP

## 2017-01-12 NOTE — DISCHARGE NOTE ADULT - MEDICATION SUMMARY - MEDICATIONS TO STOP TAKING
I will STOP taking the medications listed below when I get home from the hospital:    fluconazole 150 mg oral tablet  -- 1 tab(s) by mouth once a week for 6 weeks  -- Do not take this drug if you are pregnant.  Finish all this medication unless otherwise directed by prescriber.    ketoconazole topical 2% topical cream  -- Apply on skin to affected area 2 times a day  -- Check with your doctor before becoming pregnant.  For external use only.  Obtain medical advice before taking any non-prescription drugs as some may affect the action of this medication.    montelukast  --  by mouth

## 2017-01-12 NOTE — DISCHARGE NOTE ADULT - SECONDARY DIAGNOSIS.
Closed nondisplaced fracture of left patella, unspecified fracture morphology, initial encounter History of hypertension Hypercholesterolemia Hypothyroidism, unspecified type

## 2017-01-12 NOTE — DISCHARGE NOTE ADULT - PATIENT PORTAL LINK FT
“You can access the [facility] Patient Portal, offered by [facility], by registering with the following website: [email or web address]”

## 2017-01-12 NOTE — DISCHARGE NOTE ADULT - CARE PLAN
Principal Discharge DX:	Closed fracture of distal end of left radius, unspecified fracture morphology, initial encounter  Goal:	NWB L upper extremity  Instructions for follow-up, activity and diet:	Please follow up with Dr. Moreira within 2 week   NWB L Upper extremity  Secondary Diagnosis:	Closed nondisplaced fracture of left patella, unspecified fracture morphology, initial encounter  Goal:	WBAT, ambulation  Instructions for follow-up, activity and diet:	Please follow up with Dr. Moreira within 2 week  staples to be removed by 1/25  Secondary Diagnosis:	History of hypertension  Goal:	Please continue current management and follow up with PCP  Secondary Diagnosis:	Hypercholesterolemia  Goal:	Please continue current management and follow up with PCP  Secondary Diagnosis:	Hypothyroidism, unspecified type  Goal:	Please continue current management and follow up with PCP

## 2017-01-12 NOTE — DISCHARGE NOTE ADULT - OTHER SIGNIFICANT FINDINGS
Patient ID: [id number] Patient Name: KAREN PEREZ   YOB: 1934 Sex: F      EXAM: KNEE COMPLETE LEFT (4 VIEWS)      PROCEDURE DATE: 01/08/2017      INTERPRETATION: CLINICAL HISTORY:Injury with left knee pain.    TECHNIQUE: AP, lateral, and oblique radiographs    FINDINGS: There is a comminuted fracture of the lateral patella . There is a  suprapatellar joint effusion. Femoral tibial condyles and fibular head  intact.    IMPRESSION:  Patella fracture as described              VIRA GARNER M.D., ATTENDING RADIOLOGIST  This document has been electronically signed. Jan 8 2017 9:56AM

## 2017-01-12 NOTE — DISCHARGE NOTE ADULT - CARE PROVIDER_API CALL
Pete Moreira), Orthopaedic Surgery  [address] [place] [id number]  Phone: [phone]  Fax: [phone]

## 2017-01-12 NOTE — DISCHARGE NOTE ADULT - MEDICATION SUMMARY - MEDICATIONS TO TAKE
I will START or STAY ON the medications listed below when I get home from the hospital:    traMADol 50 mg oral tablet  -- 1 tab(s) by mouth every 6 hours, As needed, Severe Pain (7 - 10)  -- Indication: For Prn pain    quinapril 40 mg oral tablet  -- 1 tab(s) by mouth once a day  -- Indication: For  htn     Lovenox 40 mg/0.4 mL injectable solution  -- 40 milligram(s) injectable once a day  -- for two weeks or until ambulating   -- Indication: For DVT prophylaxis     Lipitor 20 mg oral tablet  -- 1 tab(s) by mouth once a day  -- Indication: For Hld     Xanax  -- 0.5 milligram(s) by mouth 2 times a day  -- Indication: For Anxiety     Lopressor  -- 100 milligram(s) by mouth   -- Indication: For Htn     Synthroid  -- 100 microgram(s) by mouth   -- Indication: For Hypothyroid     Calcium 500+D oral tablet, chewable  -- 1 tab(s) by mouth 2 times a day  -- Indication: For Calcium

## 2017-01-17 DIAGNOSIS — S52.502A UNSPECIFIED FRACTURE OF THE LOWER END OF LEFT RADIUS, INITIAL ENCOUNTER FOR CLOSED FRACTURE: ICD-10-CM

## 2017-01-17 DIAGNOSIS — F41.9 ANXIETY DISORDER, UNSPECIFIED: ICD-10-CM

## 2017-01-17 DIAGNOSIS — I35.0 NONRHEUMATIC AORTIC (VALVE) STENOSIS: ICD-10-CM
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
## 2017-01-17 DIAGNOSIS — I10 ESSENTIAL (PRIMARY) HYPERTENSION: ICD-10-CM
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
## 2017-01-17 DIAGNOSIS — S82.032A DISPLACED TRANSVERSE FRACTURE OF LEFT PATELLA, INITIAL ENCOUNTER FOR CLOSED FRACTURE: ICD-10-CM

## 2017-01-17 DIAGNOSIS — E78.00 PURE HYPERCHOLESTEROLEMIA, UNSPECIFIED: ICD-10-CM
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
## 2018-03-10 ENCOUNTER — INPATIENT (INPATIENT)
Facility: HOSPITAL | Age: 83
LOS: 2 days | Discharge: HOME | DRG: 69 | End: 2018-03-13
Attending: INTERNAL MEDICINE | Admitting: INTERNAL MEDICINE
Payer: MEDICARE

## 2018-03-10 VITALS
OXYGEN SATURATION: 99 % | SYSTOLIC BLOOD PRESSURE: 178 MMHG | HEART RATE: 62 BPM | RESPIRATION RATE: 17 BRPM | TEMPERATURE: 98 F | DIASTOLIC BLOOD PRESSURE: 79 MMHG

## 2018-03-10 DIAGNOSIS — Z29.9 ENCOUNTER FOR PROPHYLACTIC MEASURES, UNSPECIFIED: ICD-10-CM

## 2018-03-10 DIAGNOSIS — E78.5 HYPERLIPIDEMIA, UNSPECIFIED: ICD-10-CM

## 2018-03-10 DIAGNOSIS — Z98.890 OTHER SPECIFIED POSTPROCEDURAL STATES: Chronic | ICD-10-CM

## 2018-03-10 DIAGNOSIS — E03.9 HYPOTHYROIDISM, UNSPECIFIED: ICD-10-CM

## 2018-03-10 DIAGNOSIS — Z86.79 PERSONAL HISTORY OF OTHER DISEASES OF THE CIRCULATORY SYSTEM: ICD-10-CM

## 2018-03-10 DIAGNOSIS — R53.1 WEAKNESS: ICD-10-CM

## 2018-03-10 PROBLEM — E78.00 PURE HYPERCHOLESTEROLEMIA, UNSPECIFIED: Chronic | Status: ACTIVE | Noted: 2017-01-08

## 2018-03-10 LAB
ALBUMIN SERPL ELPH-MCNC: 3.2 G/DL — LOW (ref 3.5–5)
ALP SERPL-CCNC: 73 U/L — SIGNIFICANT CHANGE UP (ref 40–120)
ALT FLD-CCNC: 13 U/L DA — SIGNIFICANT CHANGE UP (ref 10–60)
ANION GAP SERPL CALC-SCNC: 7 MMOL/L — SIGNIFICANT CHANGE UP (ref 5–17)
APPEARANCE UR: CLEAR — SIGNIFICANT CHANGE UP
APTT BLD: 30.2 SEC — SIGNIFICANT CHANGE UP (ref 27.5–37.4)
AST SERPL-CCNC: 17 U/L — SIGNIFICANT CHANGE UP (ref 10–40)
BASOPHILS # BLD AUTO: 0.1 K/UL — SIGNIFICANT CHANGE UP (ref 0–0.2)
BASOPHILS NFR BLD AUTO: 1.7 % — SIGNIFICANT CHANGE UP (ref 0–2)
BILIRUB SERPL-MCNC: 0.4 MG/DL — SIGNIFICANT CHANGE UP (ref 0.2–1.2)
BILIRUB UR-MCNC: NEGATIVE — SIGNIFICANT CHANGE UP
BUN SERPL-MCNC: 14 MG/DL — SIGNIFICANT CHANGE UP (ref 7–18)
CALCIUM SERPL-MCNC: 7.5 MG/DL — LOW (ref 8.4–10.5)
CHLORIDE SERPL-SCNC: 107 MMOL/L — SIGNIFICANT CHANGE UP (ref 96–108)
CO2 SERPL-SCNC: 28 MMOL/L — SIGNIFICANT CHANGE UP (ref 22–31)
COLOR SPEC: YELLOW — SIGNIFICANT CHANGE UP
CREAT SERPL-MCNC: 0.84 MG/DL — SIGNIFICANT CHANGE UP (ref 0.5–1.3)
DIFF PNL FLD: ABNORMAL
EOSINOPHIL # BLD AUTO: 0.2 K/UL — SIGNIFICANT CHANGE UP (ref 0–0.5)
EOSINOPHIL NFR BLD AUTO: 2.6 % — SIGNIFICANT CHANGE UP (ref 0–6)
GLUCOSE SERPL-MCNC: 88 MG/DL — SIGNIFICANT CHANGE UP (ref 70–99)
GLUCOSE UR QL: NEGATIVE — SIGNIFICANT CHANGE UP
HCT VFR BLD CALC: 38.6 % — SIGNIFICANT CHANGE UP (ref 34.5–45)
HGB BLD-MCNC: 12.7 G/DL — SIGNIFICANT CHANGE UP (ref 11.5–15.5)
INR BLD: 1.11 RATIO — SIGNIFICANT CHANGE UP (ref 0.88–1.16)
KETONES UR-MCNC: NEGATIVE — SIGNIFICANT CHANGE UP
LACTATE SERPL-SCNC: 1.2 MMOL/L — SIGNIFICANT CHANGE UP (ref 0.7–2)
LEUKOCYTE ESTERASE UR-ACNC: ABNORMAL
LYMPHOCYTES # BLD AUTO: 1.4 K/UL — SIGNIFICANT CHANGE UP (ref 1–3.3)
LYMPHOCYTES # BLD AUTO: 21.4 % — SIGNIFICANT CHANGE UP (ref 13–44)
MCHC RBC-ENTMCNC: 30.2 PG — SIGNIFICANT CHANGE UP (ref 27–34)
MCHC RBC-ENTMCNC: 33 GM/DL — SIGNIFICANT CHANGE UP (ref 32–36)
MCV RBC AUTO: 91.7 FL — SIGNIFICANT CHANGE UP (ref 80–100)
MONOCYTES # BLD AUTO: 0.6 K/UL — SIGNIFICANT CHANGE UP (ref 0–0.9)
MONOCYTES NFR BLD AUTO: 8.4 % — SIGNIFICANT CHANGE UP (ref 2–14)
NEUTROPHILS # BLD AUTO: 4.4 K/UL — SIGNIFICANT CHANGE UP (ref 1.8–7.4)
NEUTROPHILS NFR BLD AUTO: 65.9 % — SIGNIFICANT CHANGE UP (ref 43–77)
NITRITE UR-MCNC: NEGATIVE — SIGNIFICANT CHANGE UP
PH UR: 7 — SIGNIFICANT CHANGE UP (ref 5–8)
PLATELET # BLD AUTO: 216 K/UL — SIGNIFICANT CHANGE UP (ref 150–400)
POTASSIUM SERPL-MCNC: 3.9 MMOL/L — SIGNIFICANT CHANGE UP (ref 3.5–5.3)
POTASSIUM SERPL-SCNC: 3.9 MMOL/L — SIGNIFICANT CHANGE UP (ref 3.5–5.3)
PROT SERPL-MCNC: 7.6 G/DL — SIGNIFICANT CHANGE UP (ref 6–8.3)
PROT UR-MCNC: 15
PROTHROM AB SERPL-ACNC: 12.1 SEC — SIGNIFICANT CHANGE UP (ref 9.8–12.7)
RBC # BLD: 4.21 M/UL — SIGNIFICANT CHANGE UP (ref 3.8–5.2)
RBC # FLD: 12.8 % — SIGNIFICANT CHANGE UP (ref 10.3–14.5)
SODIUM SERPL-SCNC: 142 MMOL/L — SIGNIFICANT CHANGE UP (ref 135–145)
SP GR SPEC: 1 — LOW (ref 1.01–1.02)
TROPONIN I SERPL-MCNC: <0.015 NG/ML — SIGNIFICANT CHANGE UP (ref 0–0.04)
UROBILINOGEN FLD QL: NEGATIVE — SIGNIFICANT CHANGE UP
WBC # BLD: 6.6 K/UL — SIGNIFICANT CHANGE UP (ref 3.8–10.5)
WBC # FLD AUTO: 6.6 K/UL — SIGNIFICANT CHANGE UP (ref 3.8–10.5)

## 2018-03-10 PROCEDURE — 99285 EMERGENCY DEPT VISIT HI MDM: CPT

## 2018-03-10 PROCEDURE — 93010 ELECTROCARDIOGRAM REPORT: CPT

## 2018-03-10 PROCEDURE — 70450 CT HEAD/BRAIN W/O DYE: CPT | Mod: 26

## 2018-03-10 RX ORDER — ALPRAZOLAM 0.25 MG
0.5 TABLET ORAL
Qty: 0 | Refills: 0 | Status: DISCONTINUED | OUTPATIENT
Start: 2018-03-10 | End: 2018-03-13

## 2018-03-10 RX ORDER — ATORVASTATIN CALCIUM 80 MG/1
40 TABLET, FILM COATED ORAL AT BEDTIME
Qty: 0 | Refills: 0 | Status: DISCONTINUED | OUTPATIENT
Start: 2018-03-10 | End: 2018-03-13

## 2018-03-10 RX ORDER — ENOXAPARIN SODIUM 100 MG/ML
40 INJECTION SUBCUTANEOUS
Qty: 0 | Refills: 0 | COMMUNITY

## 2018-03-10 RX ORDER — ACETAMINOPHEN 500 MG
650 TABLET ORAL EVERY 6 HOURS
Qty: 0 | Refills: 0 | Status: DISCONTINUED | OUTPATIENT
Start: 2018-03-10 | End: 2018-03-13

## 2018-03-10 RX ORDER — SODIUM CHLORIDE 9 MG/ML
1000 INJECTION INTRAMUSCULAR; INTRAVENOUS; SUBCUTANEOUS ONCE
Qty: 0 | Refills: 0 | Status: COMPLETED | OUTPATIENT
Start: 2018-03-10 | End: 2018-03-10

## 2018-03-10 RX ORDER — LEVOTHYROXINE SODIUM 125 MCG
100 TABLET ORAL DAILY
Qty: 0 | Refills: 0 | Status: DISCONTINUED | OUTPATIENT
Start: 2018-03-10 | End: 2018-03-13

## 2018-03-10 RX ORDER — ALPRAZOLAM 0.25 MG
0.5 TABLET ORAL
Qty: 0 | Refills: 0 | COMMUNITY

## 2018-03-10 RX ORDER — ASPIRIN/CALCIUM CARB/MAGNESIUM 324 MG
1 TABLET ORAL
Qty: 0 | Refills: 0 | COMMUNITY

## 2018-03-10 RX ORDER — LEVOTHYROXINE SODIUM 125 MCG
100 TABLET ORAL
Qty: 0 | Refills: 0 | COMMUNITY

## 2018-03-10 RX ORDER — LISINOPRIL 2.5 MG/1
40 TABLET ORAL DAILY
Qty: 0 | Refills: 0 | Status: DISCONTINUED | OUTPATIENT
Start: 2018-03-11 | End: 2018-03-13

## 2018-03-10 RX ORDER — ATORVASTATIN CALCIUM 80 MG/1
20 TABLET, FILM COATED ORAL AT BEDTIME
Qty: 0 | Refills: 0 | Status: DISCONTINUED | OUTPATIENT
Start: 2018-03-10 | End: 2018-03-10

## 2018-03-10 RX ORDER — METOPROLOL TARTRATE 50 MG
100 TABLET ORAL
Qty: 0 | Refills: 0 | COMMUNITY

## 2018-03-10 RX ORDER — ASPIRIN/CALCIUM CARB/MAGNESIUM 324 MG
325 TABLET ORAL DAILY
Qty: 0 | Refills: 0 | Status: DISCONTINUED | OUTPATIENT
Start: 2018-03-10 | End: 2018-03-13

## 2018-03-10 RX ORDER — METOPROLOL TARTRATE 50 MG
50 TABLET ORAL DAILY
Qty: 0 | Refills: 0 | Status: DISCONTINUED | OUTPATIENT
Start: 2018-03-10 | End: 2018-03-13

## 2018-03-10 RX ORDER — MONTELUKAST 4 MG/1
10 TABLET, CHEWABLE ORAL AT BEDTIME
Qty: 0 | Refills: 0 | Status: DISCONTINUED | OUTPATIENT
Start: 2018-03-10 | End: 2018-03-13

## 2018-03-10 RX ORDER — MONTELUKAST 4 MG/1
1 TABLET, CHEWABLE ORAL
Qty: 0 | Refills: 0 | COMMUNITY

## 2018-03-10 RX ORDER — METOPROLOL TARTRATE 50 MG
1 TABLET ORAL
Qty: 0 | Refills: 0 | COMMUNITY

## 2018-03-10 RX ADMIN — SODIUM CHLORIDE 4000 MILLILITER(S): 9 INJECTION INTRAMUSCULAR; INTRAVENOUS; SUBCUTANEOUS at 18:52

## 2018-03-10 RX ADMIN — Medication 0.5 MILLIGRAM(S): at 22:29

## 2018-03-10 RX ADMIN — ATORVASTATIN CALCIUM 40 MILLIGRAM(S): 80 TABLET, FILM COATED ORAL at 22:29

## 2018-03-10 RX ADMIN — Medication 325 MILLIGRAM(S): at 22:29

## 2018-03-10 NOTE — H&P ADULT - ASSESSMENT
83 years old female from home , stays alone, ambulates independently, PMH of HTN, HLD, Hypothyroidism presents to the ED sent in by Dr. TAMIKO Edwards (covering for Dr. Naranjo) to r/o CVA after abnormal carotid US done at office today showing left sided carotid stenosis (50-70%).

## 2018-03-10 NOTE — ED ADULT NURSE REASSESSMENT NOTE - NS ED NURSE REASSESS COMMENT FT1
pt sleeping comfortably easily aroused report given to RN Darlin, bed not ready, ed observation continues.

## 2018-03-10 NOTE — H&P ADULT - PROBLEM SELECTOR PLAN 2
-c/w home BP meds Toprol XL and Quinapril at home dose  -No need of permissive htn as patient's symptoms were 1-2 weeks back.   -Monitor BP

## 2018-03-10 NOTE — H&P ADULT - PROBLEM SELECTOR PLAN 5
IMPROVE VTE Individual Risk Assessment          RISK                                                          Points  [  ] Previous VTE                                                3  [  ] Thrombophilia                                             2  [  ] Lower limb paralysis                                   2        (unable to hold up >15 seconds)    [  ] Current Cancer                                             2         (within 6 months)  [ x ] Immobilization > 24 hrs                              1  [  ] ICU/CCU stay > 24 hours                             1  [ x ] Age > 60                                                         1    IMPROVE VTE Score: 2  Heparin sq for DVT ppx.

## 2018-03-10 NOTE — H&P ADULT - FAMILY HISTORY
Father  Still living? Unknown  Family history of stroke (cerebrovascular), Age at diagnosis: Age Unknown

## 2018-03-10 NOTE — ED PROVIDER NOTE - OBJECTIVE STATEMENT
84 y/o F pt w/ PMHx of HTN, hypothyroid HLD presents to the ED c/o heart murmurs and weakness x1 week. Pt received sonogram on her neck and was sent in by PMD, Dr. Burton, for further evaluation. Pt notes being on Cipro for x1 week for a UTI. Denies trouble speaking, numbness, chest pain, headache or any other complaints. NKDA. 84 y/o F pt w/ PMHx of HTN, hypothyroid HLD presents to the ED sent in by Dr. Burton (covering for Dr. Naranjo) to r/o CVA. Pt reports generalized weakness x1 week. Pt had been treated for uti x last one week with cipro. Completed course. Feels like urinary sx resolved.  Went in for follow up appointment today. Had sonogram of carotid done at the office today which showed occlusion and was sent in for stroke eval/ mri, formal studies.  Denies trouble speaking, focal weakness, numbness, chest pain, headache or any other complaints. NKDA. 82 y/o F pt w/ PMHx of HTN, hypothyroid HLD presents to the ED sent in by Dr. TAMIKO Edwards (covering for Dr. Naranjo) to r/o CVA. Pt reports generalized weakness x1 week. Pt had been treated for uti x last one week with cipro. Completed course. Feels like urinary sx resolved.  Went in for follow up appointment today. Had sonogram of carotid done at the office today which showed occlusion and was sent in for stroke eval/ mri, formal studies.  Denies trouble speaking, focal weakness, numbness, chest pain, headache or any other complaints. NKDA.

## 2018-03-10 NOTE — H&P ADULT - PROBLEM SELECTOR PLAN 1
-Patient with recent episode of confusion and funny speech which resolved and then has been having generalized weakness over past week  -CT head negative for any acute events  -Carotid doppler done as outpatient showed 50-70% stenosis on left side as per patient  -Discussed with attending, will get repeat Carotid doppler, and also MRI   (form signed and in chart)  -EKG: NSR with RBBB (same as before)  -Monitor on tele, trend cardiac enzymes x 2, first is negative  -f/u ECHO  -c/w aspirin and statin  -DVT ppx  -Neuro on call Dr. Key consulted  -Cardio Dr. Mota consulted  -PT eval

## 2018-03-10 NOTE — ED PROVIDER NOTE - MEDICAL DECISION MAKING DETAILS
labs reassuring. ct head pending read, but appears nothing acute. pt comfortable and asymptomatic. will admit for mri brain, formal carotid dopplers, lipid profile, further mgmt  ua also pending given pt recently treated for uti

## 2018-03-10 NOTE — ED ADULT NURSE REASSESSMENT NOTE - NS ED NURSE REASSESS COMMENT FT1
Pt transfer on portable cardiac monitor in no respiratory or cardiac distress accompanied by nurse and transporter.

## 2018-03-10 NOTE — ED ADULT NURSE NOTE - OBJECTIVE STATEMENT
Sent by MD for cardiac murmur. On ausculation murmur present sometimes with palpitations, dizziness and SOB

## 2018-03-10 NOTE — H&P ADULT - HISTORY OF PRESENT ILLNESS
83 years old female from home , stays alone, ambulates independently, PMH of HTN, HLD, Hypothyroidism presents to the ED sent in by Dr. TAMIKO Edwards (covering for Dr. Naranjo) to r/o CVA. Patient states that 2 weeks back she had an episode of confusion causing inability to dial correct phone number and also speech was sounding funny, these symptoms resolved. Patient has been reporting generalized weakness x1-2 weeks. Patient was seen by PCP last week, dose of aspirin was increased from 81 mg to 325 mg and also was found to have UTI for which she was treated with Ciprofloxacin for 1 week. Patient's urinary symptoms have resolved and she completed the course. 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 Currently complaining of headache and weakness only. Denies any speech difficulties, focal weakness, numbness, dizziness, chest pain, sob, palpitations, nausea vomiting, abdominal pain, diarrhea, fever, chills, cough or urinary complains.

## 2018-03-10 NOTE — ED ADULT NURSE REASSESSMENT NOTE - NS ED NURSE REASSESS COMMENT FT1
Pt received aoxo3 resting in bed eating dinner no distress noted, safety precaution maintained ed observation continues.

## 2018-03-11 DIAGNOSIS — N39.0 URINARY TRACT INFECTION, SITE NOT SPECIFIED: ICD-10-CM

## 2018-03-11 LAB
ANION GAP SERPL CALC-SCNC: 9 MMOL/L — SIGNIFICANT CHANGE UP (ref 5–17)
BUN SERPL-MCNC: 13 MG/DL — SIGNIFICANT CHANGE UP (ref 7–18)
CALCIUM SERPL-MCNC: 7 MG/DL — LOW (ref 8.4–10.5)
CHLORIDE SERPL-SCNC: 108 MMOL/L — SIGNIFICANT CHANGE UP (ref 96–108)
CHOLEST SERPL-MCNC: 146 MG/DL — SIGNIFICANT CHANGE UP (ref 10–199)
CK MB BLD-MCNC: 0.6 % — SIGNIFICANT CHANGE UP (ref 0–3.5)
CK MB CFR SERPL CALC: 1.9 NG/ML — SIGNIFICANT CHANGE UP (ref 0–3.6)
CK SERPL-CCNC: 304 U/L — HIGH (ref 21–215)
CO2 SERPL-SCNC: 27 MMOL/L — SIGNIFICANT CHANGE UP (ref 22–31)
CREAT SERPL-MCNC: 0.7 MG/DL — SIGNIFICANT CHANGE UP (ref 0.5–1.3)
CULTURE RESULTS: SIGNIFICANT CHANGE UP
FOLATE SERPL-MCNC: 18.1 NG/ML — SIGNIFICANT CHANGE UP (ref 4.8–24.2)
GLUCOSE SERPL-MCNC: 103 MG/DL — HIGH (ref 70–99)
HBA1C BLD-MCNC: 6.4 % — HIGH (ref 4–5.6)
HDLC SERPL-MCNC: 69 MG/DL — SIGNIFICANT CHANGE UP (ref 40–125)
LIPID PNL WITH DIRECT LDL SERPL: 65 MG/DL — SIGNIFICANT CHANGE UP
MAGNESIUM SERPL-MCNC: 2.2 MG/DL — SIGNIFICANT CHANGE UP (ref 1.6–2.6)
PHOSPHATE SERPL-MCNC: 5.7 MG/DL — HIGH (ref 2.5–4.5)
POTASSIUM SERPL-MCNC: 3.2 MMOL/L — LOW (ref 3.5–5.3)
POTASSIUM SERPL-SCNC: 3.2 MMOL/L — LOW (ref 3.5–5.3)
SODIUM SERPL-SCNC: 144 MMOL/L — SIGNIFICANT CHANGE UP (ref 135–145)
SPECIMEN SOURCE: SIGNIFICANT CHANGE UP
TOTAL CHOLESTEROL/HDL RATIO MEASUREMENT: 2.1 RATIO — LOW (ref 3.3–7.1)
TRIGL SERPL-MCNC: 61 MG/DL — SIGNIFICANT CHANGE UP (ref 10–149)
TROPONIN I SERPL-MCNC: <0.015 NG/ML — SIGNIFICANT CHANGE UP (ref 0–0.04)
TSH SERPL-MCNC: 0.36 UU/ML — SIGNIFICANT CHANGE UP (ref 0.34–4.82)
VIT B12 SERPL-MCNC: 1909 PG/ML — HIGH (ref 232–1245)

## 2018-03-11 PROCEDURE — 93306 TTE W/DOPPLER COMPLETE: CPT | Mod: 26

## 2018-03-11 PROCEDURE — 99223 1ST HOSP IP/OBS HIGH 75: CPT

## 2018-03-11 PROCEDURE — 93880 EXTRACRANIAL BILAT STUDY: CPT | Mod: 26

## 2018-03-11 RX ORDER — CEFTRIAXONE 500 MG/1
INJECTION, POWDER, FOR SOLUTION INTRAMUSCULAR; INTRAVENOUS
Qty: 0 | Refills: 0 | Status: DISCONTINUED | OUTPATIENT
Start: 2018-03-11 | End: 2018-03-13

## 2018-03-11 RX ORDER — CEFTRIAXONE 500 MG/1
1 INJECTION, POWDER, FOR SOLUTION INTRAMUSCULAR; INTRAVENOUS EVERY 24 HOURS
Qty: 0 | Refills: 0 | Status: DISCONTINUED | OUTPATIENT
Start: 2018-03-12 | End: 2018-03-13

## 2018-03-11 RX ORDER — CEFTRIAXONE 500 MG/1
1 INJECTION, POWDER, FOR SOLUTION INTRAMUSCULAR; INTRAVENOUS ONCE
Qty: 0 | Refills: 0 | Status: COMPLETED | OUTPATIENT
Start: 2018-03-11 | End: 2018-03-11

## 2018-03-11 RX ORDER — ALPRAZOLAM 0.25 MG
0.5 TABLET ORAL ONCE
Qty: 0 | Refills: 0 | Status: DISCONTINUED | OUTPATIENT
Start: 2018-03-11 | End: 2018-03-11

## 2018-03-11 RX ORDER — POTASSIUM CHLORIDE 20 MEQ
40 PACKET (EA) ORAL ONCE
Qty: 0 | Refills: 0 | Status: COMPLETED | OUTPATIENT
Start: 2018-03-11 | End: 2018-03-11

## 2018-03-11 RX ADMIN — Medication 650 MILLIGRAM(S): at 21:02

## 2018-03-11 RX ADMIN — MONTELUKAST 10 MILLIGRAM(S): 4 TABLET, CHEWABLE ORAL at 11:01

## 2018-03-11 RX ADMIN — LISINOPRIL 40 MILLIGRAM(S): 2.5 TABLET ORAL at 05:29

## 2018-03-11 RX ADMIN — Medication 0.5 MILLIGRAM(S): at 18:30

## 2018-03-11 RX ADMIN — Medication 650 MILLIGRAM(S): at 19:20

## 2018-03-11 RX ADMIN — Medication 50 MILLIGRAM(S): at 05:29

## 2018-03-11 RX ADMIN — Medication 100 MICROGRAM(S): at 05:29

## 2018-03-11 RX ADMIN — ATORVASTATIN CALCIUM 40 MILLIGRAM(S): 80 TABLET, FILM COATED ORAL at 21:47

## 2018-03-11 RX ADMIN — Medication 1 TABLET(S): at 11:01

## 2018-03-11 RX ADMIN — CEFTRIAXONE 100 GRAM(S): 500 INJECTION, POWDER, FOR SOLUTION INTRAMUSCULAR; INTRAVENOUS at 11:00

## 2018-03-11 RX ADMIN — Medication 0.5 MILLIGRAM(S): at 05:29

## 2018-03-11 RX ADMIN — Medication 40 MILLIEQUIVALENT(S): at 11:00

## 2018-03-11 RX ADMIN — Medication 0.5 MILLIGRAM(S): at 11:30

## 2018-03-11 RX ADMIN — Medication 325 MILLIGRAM(S): at 11:01

## 2018-03-11 NOTE — PROGRESS NOTE ADULT - ASSESSMENT
83 years old female from home, stays alone, ambulates independently, PMH HTN, HLD, Hypothyroidism presents to the ED sent in by PMD to r/o CVA after abnormal carotid US associated w/ episode of  confusion and speech disturbance x 1 week ago  - HCP [phone], Armond Mena

## 2018-03-11 NOTE — CONSULT NOTE ADULT - SUBJECTIVE AND OBJECTIVE BOX
Patient is a 83y old  Female who presents with a chief complaint of sent by PCP to r/o CVA (10 Mar 2018 20:11)      HPI:  83 years old female from home , stays alone, ambulates independently, PMH of HTN, HLD, Hypothyroidism presents to the ED sent in by Dr. TAMIKO Edwards (covering for Dr. Naranjo) to r/o CVA. Patient states that 2 weeks back she had an episode of confusion causing inability to dial correct phone number and also speech was sounding funny, these symptoms resolved. Patient has been reporting generalized weakness x1-2 weeks. Patient was seen by PCP last week, dose of aspirin was increased from 81 mg to 325 mg and also was found to have UTI for which she was treated with Ciprofloxacin for 1 week. Patient's urinary symptoms have resolved and she completed the course. 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 Currently complaining of headache and weakness only. Denies any speech difficulties, focal weakness, numbness, dizziness, chest pain, sob, palpitations, nausea vomiting, abdominal pain, diarrhea, fever, chills, cough or urinary complains. (10 Mar 2018 20:11)           The patient was last know well at  The patient lives at home/ NH.  The patient walks without assistance/ with a cane or walker    Neurological Review of Systems:  No difficulty with language.  No vision loss or double vision.  No dizziness, vertigo or new hearing loss.  No difficulty with speech or swallowing.  No focal weakness.  No focal sensory changes.  No numbness or tingling in the bilateral lower extremities.  No difficulty with balance.  No difficulty with ambulation.      MEDICATIONS  (STANDING):  ALPRAZolam 0.5 milliGRAM(s) Oral once  ALPRAZolam 0.5 milliGRAM(s) Oral two times a day  aspirin 325 milliGRAM(s) Oral daily  atorvastatin 40 milliGRAM(s) Oral at bedtime  calcium carbonate  625 mG + Vitamin D (OsCal 250 + D) 1 Tablet(s) Oral daily  cefTRIAXone   IVPB      levothyroxine 100 MICROGram(s) Oral daily  lisinopril 40 milliGRAM(s) Oral daily  metoprolol succinate ER 50 milliGRAM(s) Oral daily  montelukast 10 milliGRAM(s) Oral daily    MEDICATIONS  (PRN):  acetaminophen   Tablet. 650 milliGRAM(s) Oral every 6 hours PRN Mild Pain (1 - 3)    Allergies    No Known Allergies    Intolerances      PAST MEDICAL & SURGICAL HISTORY:  Hypothyroid  Hypercholesterolemia  History of hypertension  H/O left knee surgery: repair only    FAMILY HISTORY:  Family history of stroke (cerebrovascular) (Father)    SOCIAL HISTORY: non smoker/ former smoker/ active smoker    Review of Systems:  Constitutional: No generalized weakness. No fevers or chills.                    Eyes, Ears, Mouth, Throat: No vision loss   Respiratory: No shortness of breath or cough.                                Cardiovascular: No chest pain or palpitations  Gastrointestinal: No nausea or vomiting.                                         Genitourinary: No urinary incontinence or burning on urination.  Musculoskeletal: No joint pain.                                                           Dermatologic: No rash.  Neurological: as per HPI                                                                      Psychiatric: No behavioral problems.  Endocrine: No known hypoglycemia.               Hematologic/Lymphatic: No easy bleeding.    O:  Vital Signs Last 24 Hrs  T(C): 37.1 (11 Mar 2018 16:12), Max: 37.1 (11 Mar 2018 16:12)  T(F): 98.8 (11 Mar 2018 16:12), Max: 98.8 (11 Mar 2018 16:12)  HR: 56 (11 Mar 2018 16:12) (55 - 66)  BP: 181/82 (11 Mar 2018 16:12) (143/70 - 191/66)  BP(mean): --  RR: 18 (11 Mar 2018 16:12) (18 - 18)  SpO2: 98% (11 Mar 2018 16:12) (94% - 100%)    General Exam:   General appearance: No acute distress                 Cardiovascular: Pedal dorsalis pulses intact bilaterally    Neurological Exam:  NIH Stroke Scale (NIHSS):   1a. LOConscious:  0-alert 1-lethargy 2-obtund 3-coma:    _____  1b. LOC Questions:  0-both 1-one 2-none                       _____  1c. LOC Commands:  0-both 1-one 2-none                     _____  2.   Gaze:  0-nl 1-partial 2-conjugate                                _____  3.   Visual:  0-nl 1-part jennifer 2-full jennifer 3-bilat jennifer         _____  4.   Facial Palsy:  0-nl 1-minor 2-part 3-complete             _____  5.   Motor Arm:  0-nl 1-drift 2-effort 3-no effort         Left             _____                              4-no move UN-amputated                     Right  _____  6.   Motor Leg:                                                                 Left   _____                                                                                   Right _____  7.   Ataxia:  0-nl 1-one limb 2-two UN-amp                      _____  8.   Sensory:  0-nl 1-mild 2-severe                                  _____  9.   Language:  0-nl 1-mild 2-severe 3-mute                     _____  10.  Dysarthria:  0-nl 1-mild 2-severe 3-barrier                  _____  11.  Extinction/Inattention:  0-nl 1-mild 2-deep                 _____         TOTAL NIHSS       ________    Mental Status: Orientated to self, date and place.  Attention intact.  No dysarthria, aphasia or neglect.  Knowledge intact.  Registration intact.  Short and long term memory grossly intact.      Cranial Nerves: CN I - not tested.  PERRL, EOMI, VFF, no nystagmus or diplopia.  No APD.  Fundi not visualized bilaterally.  CN V1-3 intact to light touch and pinprick.  No facial asymmetry.  Hearing intact to finger rub bilaterally.  Tongue, uvula and palate midline.  Sternocleidomastoid and Trapezius intact bilaterally.    Motor:   Tone: normal.                  Strength intact throughout  No pronator drift bilaterally                      No dysmetria on finger-nose-finger or heel-shin-heel  No truncal ataxia.  No resting, postural or action tremor.  No myoclonus.    Sensation: intact to light touch, pinprick, vibration and proprioception    Deep Tendon Reflexes: 1+ bilateral biceps, triceps, brachioradialis, knee and ankle  Toes flexor bilaterally    Gait: normal and stable.  Rhomberg -frantz.    Other:      LABS:                        12.7   6.6   )-----------( 216      ( 10 Mar 2018 16:05 )             38.6     03-11    144  |  108  |  13  ----------------------------<  103<H>  3.2<L>   |  27  |  0.70    Ca    7.0<L>      11 Mar 2018 07:31  Phos  5.7     -  Mg     2.2     03-11    TPro  7.6  /  Alb  3.2<L>  /  TBili  0.4  /  DBili  x   /  AST  17  /  ALT  13  /  AlkPhos  73  03-10    PT/INR - ( 10 Mar 2018 16:05 )   PT: 12.1 sec;   INR: 1.11 ratio         PTT - ( 10 Mar 2018 16:05 )  PTT:30.2 sec  Urinalysis Basic - ( 10 Mar 2018 18:05 )    Color: Yellow / Appearance: Clear / S.005 / pH: x  Gluc: x / Ketone: Negative  / Bili: Negative / Urobili: Negative   Blood: x / Protein: 15 / Nitrite: Negative   Leuk Esterase: Moderate / RBC: 2-5 /HPF / WBC 11-25 /HPF   Sq Epi: x / Non Sq Epi: Many /HPF / Bacteria: Moderate /HPF      LDL Lipid Profile in AM (18 @ 07:31)    Total Cholesterol/HDL Ratio Measurement: 2.1 RATIO    Cholesterol, Serum: 146 mg/dL    Triglycerides, Serum: 61 mg/dL    HDL Cholesterol, Serum: 69 mg/dL    Direct LDL: 65: LDL Cholesterol --- Interpretive Comment (for adults 18 and over)  Optimal LDL Level may vary based on clinical situation  Below 70                  Ideal for people at very high risk of heart  disease  Below 100                Ideal for people at risk of heart disease  100 - 129                   [place]  130 - 159                   Borderline high  160 - 189                   High  190 and Above          Very high mg/dL      [id number] A1C, Whole Blood: 6.4 % (03-10 @ 22:52)      RADIOLOGY & ADDITIONAL STUDIES:    EKG:   Tele: < from: Transthoracic Echocardiogram (18 @ 06:45) >  CONCLUSIONS:  1. Mild posterior mitral annular calcification. Trace  mitral regurgitation.  2. Calcified aortic valve is not well visualized. Moderate  aortic stenosis. It is possible that degree of AS is  underestimated by measured gradients.  Trace aortic  regurgitation.  3. Normal aortic root.  4. Normal left atrium.  5. Mild left ventricular enlargement.  6. Hyperdynamic left ventricular systolic function (EF  >70%).  7. Grade I diastolic dysfunction (Impaired relaxation).  8. Normal right atrium.  9. Normal right ventricular size and systolic function  (TAPSE 2.1 cm).  10. RV systolic pressure is normal at  18 mm Hg.  11. Normal tricuspid valve. Trace tricuspid regurgitation.  12. Pulmonic valve not well seen. Trace pulmonic  insufficiency is noted.  13. No pericardial effusion.    < end of copied text >    < from: CT Head No Cont (03.10.18 @ 17:11) > (images reviwed)    IMPRESSION:  No acute intracranial pathology.    Thank you for this referral.    < end of copied text >    < from: US Duplex Carotid Arteries Complete, Bilateral (18 @ 15:12) >    IMPRESSION:  No evidence of hemodynamically significant stenosis by velocity   measurements.    Measurement of carotid stenosis is based on velocity parameters that   correlate the residual internal carotid diameter with that of the more   distal vessel in accordance with a method such as the [place] American   Symptomatic Carotid Endarterectomy Trial (NASCET).     < end of copied text >      Impression:       Recommendations:  1.             Admit to telemetry   2.             MRI brain, MRA head without contrast, Carotid duplex (CD).  If unable to get MR imaging, please consider CTA head and neck in 24hours (no need for CD in this case).  If the patient is unable to get MR and unable to get IV contrast please repeat the CTH in 24hours and get a CD.  3.             TTE  4.             Please check HbA1C and fasting lipid profile  5.             Start ASA 81mg (or ASA 325mg rectally) and Lipitor 40mg HS  6.             BP goal of normal/ permissive HTN of SBP <200/<180<160  7.             NS at 125 cc/h/ D5 NS at 125 cc/hour, if NPO, if cardiac function allows, to ensure good perfusion  8.             Frequent neurochecks  9.             Urine Tox  10.          Able to resume normal diet as passed bedside swallowing test/ NPO for now  11.          Formal speech and swallow evaluation  12.          PT evaluation  13.          STAT CTH IF the patient has sudden change in mental status or neurological exam  14.          DVT PPx    Thank you for the courtesy of this consult. Patient is a 83y old  Female who presents with a chief complaint of sent by PCP to r/o CVA (10 Mar 2018 20:11)      HPI:  83 years old female from home , stays alone, ambulates independently, PMH of HTN, HLD, Hypothyroidism presents to the ED sent in by Dr. TAMIKO Edwards (covering for Dr. Naranjo) to r/o CVA. Patient states that 2 weeks back she had an episode of confusion causing inability to dial correct phone number and also speech was sounding funny, these symptoms resolved.  These symptoms started 3 weeks ago and lasted for 5 minutes.   Patient has been reporting generalized weakness x1-2 weeks. Patient was seen by PCP last week, dose of aspirin was increased from 81 mg to 325 mg and also was found to have UTI for which she was treated with Ciprofloxacin for 1 week. Patient's urinary symptoms have resolved and she completed the course. 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       The patient lives at home.  The patient walks with cane.    Neurological Review of Systems:  No difficulty with language.  No vision loss or double vision.  + dizziness, chronic  No vertigo or new hearing loss.  No difficulty with swallowing.  No focal weakness.  No focal sensory changes.  No numbness or tingling in the bilateral lower extremities.  No difficulty with balance.  No difficulty with ambulation.      MEDICATIONS  (STANDING):  ALPRAZolam 0.5 milliGRAM(s) Oral once  ALPRAZolam 0.5 milliGRAM(s) Oral two times a day  aspirin 325 milliGRAM(s) Oral daily  atorvastatin 40 milliGRAM(s) Oral at bedtime  calcium carbonate  625 mG + Vitamin D (OsCal 250 + D) 1 Tablet(s) Oral daily  cefTRIAXone   IVPB      levothyroxine 100 MICROGram(s) Oral daily  lisinopril 40 milliGRAM(s) Oral daily  metoprolol succinate ER 50 milliGRAM(s) Oral daily  montelukast 10 milliGRAM(s) Oral daily    MEDICATIONS  (PRN):  acetaminophen   Tablet. 650 milliGRAM(s) Oral every 6 hours PRN Mild Pain (1 - 3)    Allergies    No Known Allergies    Intolerances      PAST MEDICAL & SURGICAL HISTORY:  Hypothyroid  Hypercholesterolemia  History of hypertension  H/O left knee surgery: repair only    FAMILY HISTORY:  Family history of stroke (cerebrovascular) (Father)    SOCIAL HISTORY: non smoker    Review of Systems:  Constitutional: + generalized weakness.               Eyes, Ears, Mouth, Throat: No vision loss   Respiratory: No shortness of breath or cough.                                Cardiovascular: + palpitations  Gastrointestinal: No nausea or vomiting.                                         Genitourinary: + burning on urination.  Musculoskeletal: No joint pain.                                                           Dermatologic: No rash.  Neurological: as per HPI                                                                      Psychiatric: No behavioral problems.  Endocrine: No known hypoglycemia.               Hematologic/Lymphatic: No easy bleeding.    O:  Vital Signs Last 24 Hrs  T(C): 37.1 (11 Mar 2018 16:12), Max: 37.1 (11 Mar 2018 16:12)  T(F): 98.8 (11 Mar 2018 16:12), Max: 98.8 (11 Mar 2018 16:12)  HR: 56 (11 Mar 2018 16:12) (55 - 66)  BP: 181/82 (11 Mar 2018 16:12) (143/70 - 191/66)  BP(mean): --  RR: 18 (11 Mar 2018 16:12) (18 - 18)  SpO2: 98% (11 Mar 2018 16:12) (94% - 100%)    General Exam:   General appearance: No acute distress                 Cardiovascular: Pedal dorsalis pulses intact bilaterally    Neurological Exam:  NIH Stroke Scale (NIHSS):   1a. LOConscious:  0-alert 1-lethargy 2-obtund 3-coma:    ___0__  1b. LOC Questions:  0-both 1-one 2-none                       __0___  1c. LOC Commands:  0-both 1-one 2-none                     ___0__  2.   Gaze:  0-nl 1-partial 2-conjugate                                __0___  3.   Visual:  0-nl 1-part jennifer 2-full jennifer 3-bilat jennifer         ___0__  4.   Facial Palsy:  0-nl 1-minor 2-part 3-complete             ___0__  5.   Motor Arm:  0-nl 1-drift 2-effort 3-no effort         Left             _0____                              4-no move UN-amputated                     Right  __0___  6.   Motor Leg:                                                                 Left   __0___                                                                                   Right __0___  7.   Ataxia:  0-nl 1-one limb 2-two UN-amp                      __0___  8.   Sensory:  0-nl 1-mild 2-severe                                  __0___  9.   Language:  0-nl 1-mild 2-severe 3-mute                     __0___  10.  Dysarthria:  0-nl 1-mild 2-severe 3-barrier                  __0___  11.  Extinction/Inattention:  0-nl 1-mild 2-deep                 ___0__         TOTAL NIHSS       ____0____    Mental Status: Orientated to self, date and place.  Attention intact.  No dysarthria, aphasia or neglect.  Knowledge intact.  Registration intact.  Short and long term memory grossly intact.      Cranial Nerves: CN I - not tested.  PERRL, EOMI, VFF, no nystagmus or diplopia.  No APD.  Fundi not visualized bilaterally.  CN V1-3 intact to light touch.  No facial asymmetry.  Hearing intact to finger rub bilaterally.  Tongue, uvula and palate midline.  Sternocleidomastoid and Trapezius intact bilaterally.    Motor:   Tone: normal.                  Strength intact throughout  No pronator drift bilaterally                      No dysmetria on finger-nose-finger or heel-shin-heel    Sensation: intact to light touch    Deep Tendon Reflexes: 1+ bilateral biceps, triceps, brachioradialis, knee and ankle  Toes flexor bilaterally    Gait: normal and stable.    Other:      LABS:                        12.7   6.6   )-----------( 216      ( 10 Mar 2018 16:05 )             38.6     03-11    144  |  108  |  13  ----------------------------<  103<H>  3.2<L>   |  27  |  0.70    Ca    7.0<L>      11 Mar 2018 07:31  Phos  5.7     03-11  Mg     2.2     03-11    TPro  7.6  /  Alb  3.2<L>  /  TBili  0.4  /  DBili  x   /  AST  17  /  ALT  13  /  AlkPhos  73  03-10    PT/INR - ( 10 Mar 2018 16:05 )   PT: 12.1 sec;   INR: 1.11 ratio         PTT - ( 10 Mar 2018 16:05 )  PTT:30.2 sec  Urinalysis Basic - ( 10 Mar 2018 18:05 )    Color: Yellow / Appearance: Clear / S.005 / pH: x  Gluc: x / Ketone: Negative  / Bili: Negative / Urobili: Negative   Blood: x / Protein: 15 / Nitrite: Negative   Leuk Esterase: Moderate / RBC: 2-5 /HPF / WBC 11-25 /HPF   Sq Epi: x / Non Sq Epi: Many /HPF / Bacteria: Moderate /HPF      LDL Lipid Profile in AM (18 @ 07:31)    Total Cholesterol/HDL Ratio Measurement: 2.1 RATIO    Cholesterol, Serum: 146 mg/dL    Triglycerides, Serum: 61 mg/dL    HDL Cholesterol, Serum: 69 mg/dL    Direct LDL: 65: LDL Cholesterol --- Interpretive Comment (for adults 18 and over)  Optimal LDL Level may vary based on clinical situation  Below 70                  Ideal for people at very high risk of heart  disease  Below 100                Ideal for people at risk of heart disease  100 - 129                   Near [place]  130 - 159                   Borderline high  160 - 189                   High  190 and Above          Very high mg/dL      [id number] A1C, Whole Blood: 6.4 % (03-10 @ 22:52)      RADIOLOGY & ADDITIONAL STUDIES:    EKG:  NSR, R BBB  tele: NSr, NSB    Tele: < from: Transthoracic Echocardiogram (18 @ 06:45) >  CONCLUSIONS:  1. Mild posterior mitral annular calcification. Trace  mitral regurgitation.  2. Calcified aortic valve is not well visualized. Moderate  aortic stenosis. It is possible that degree of AS is  underestimated by measured gradients.  Trace aortic  regurgitation.  3. Normal aortic root.  4. Normal left atrium.  5. Mild left ventricular enlargement.  6. Hyperdynamic left ventricular systolic function (EF  >70%).  7. Grade I diastolic dysfunction (Impaired relaxation).  8. Normal right atrium.  9. Normal right ventricular size and systolic function  (TAPSE 2.1 cm).  10. RV systolic pressure is normal at  18 mm Hg.  11. Normal tricuspid valve. Trace tricuspid regurgitation.  12. Pulmonic valve not well seen. Trace pulmonic  insufficiency is noted.  13. No pericardial effusion.    < end of copied text >    < from: CT Head No Cont (03.10.18 @ 17:11) > (images reviwed)    IMPRESSION:  No acute intracranial pathology.    Thank you for this referral.    < end of copied text >    < from: US Duplex Carotid Arteries Complete, Bilateral (18 @ 15:12) >    IMPRESSION:  No evidence of hemodynamically significant stenosis by velocity   measurements.    Measurement of carotid stenosis is based on velocity parameters that   correlate the residual internal carotid diameter with that of the more   distal vessel in accordance with a method such as the North American   Symptomatic Carotid Endarterectomy Trial (NASCET).     < end of copied text >

## 2018-03-11 NOTE — PHYSICAL THERAPY INITIAL EVALUATION ADULT - ADDITIONAL COMMENTS
Patient lives alone in private house.  Must negotiate 10-12 steps to enter home and has one flight to basement.  Patient reports she ambulated independently in her home without an assistive device.  Utilizes a cane when negotiating in community. Independent with all ADLs.

## 2018-03-11 NOTE — CONSULT NOTE ADULT - SUBJECTIVE AND OBJECTIVE BOX
Requesting Physician : Dr. Naranjo    Reason for Consultation: Abnormal ECG    HISTORY OF PRESENT ILLNESS:  82 yo F with history of HTN, HLD who is being seen for abnormal ECG.  The patient was sent to the ED from her PCP office to rule out CVA.  The patient reports an episode of confusion and abnormal speech 2 weeks prior to admission.  She was seen by her PCP where she was started on abx for a UTI and a carotid dopplers was performed showing 50-70% stenosis on the left side.  She was sent in for further evaluation.  The patient denies chest pain, sob, orthopnea, palpitations, or any other cardiac complaints.  She denies a history of AFib.  She was admitted and CTH was negative for CVA.  Currently chest pain free.  Of note, the patient had a TTE in Jan of 2017 showing severe AS (unclear what further workup patient underwent).        PAST MEDICAL & SURGICAL HISTORY:  Hypothyroid  Hypercholesterolemia  History of hypertension  H/O left knee surgery: repair only          MEDICATIONS:  MEDICATIONS  (STANDING):  ALPRAZolam 0.5 milliGRAM(s) Oral once  ALPRAZolam 0.5 milliGRAM(s) Oral two times a day  aspirin 325 milliGRAM(s) Oral daily  atorvastatin 40 milliGRAM(s) Oral at bedtime  calcium carbonate  625 mG + Vitamin D (OsCal 250 + D) 1 Tablet(s) Oral daily  cefTRIAXone   IVPB      levothyroxine 100 MICROGram(s) Oral daily  lisinopril 40 milliGRAM(s) Oral daily  metoprolol succinate ER 50 milliGRAM(s) Oral daily  montelukast 10 milliGRAM(s) Oral daily      Allergies    No Known Allergies    Intolerances        FAMILY HISTORY:  Family history of stroke (cerebrovascular) (Father)    Non-contributary for premature coronary disease or sudden cardiac death    SOCIAL HISTORY:    [x ] Non-smoker  [ ] Smoker  [ ] Alcohol      REVIEW OF SYSTEMS:  [ ]chest pain  [  ]shortness of breath  [  ]palpitations  [  ]syncope  [ ]near syncope [ ]upper extremity weakness   [ ] lower extremity weakness  [  ]diplopia  [ x ]altered mental status   [  ]fevers  [ ]chills [ ]nausea  [ ]vomitting  [  ]dysphagia    [ ]abdominal pain  [ ]melena  [ ]BRBPR    [  ]epistaxis  [  ]rash    [ ]lower extremity edema        [x ] All others negative	  [ ] Unable to obtain    PHYSICAL EXAM:  T(C): 36.8 (03-11-18 @ 11:22), Max: 37 (03-11-18 @ 07:31)  HR: 55 (03-11-18 @ 11:22) (55 - 66)  BP: 168/73 (03-11-18 @ 11:22) (143/70 - 191/66)  RR: 18 (03-11-18 @ 11:22) (17 - 18)  SpO2: 100% (03-11-18 @ 11:22) (94% - 100%)  Wt(kg): --  I&O's Summary        	  Lymphatic: No lymphadenopathy , no edema  Cardiovascular: Normal S1 S2,RRR No JVD, soft systolic murmur heard throughout , Peripheral pulses palpable 2+ bilaterally  Respiratory: Lungs clear to auscultation, normal effort 	  Gastrointestinal:  Soft, Non-tender, + BS	  Skin: No rashes, No ecchymoses, No cyanosis, warm to touchh  Psychiatry:  Mood & affect appropriate      TELEMETRY: SR	    ECG: SR, RBBB 	  RADIOLOGY:  OTHER:     DIAGNOSTIC TESTING:  [ ] Echocardiogram: < from: Transthoracic Echocardiogram (01.09.17 @ 10:30) >  1. Mitral annular calcification.  2. Calcified trileaflet aortic valve with decreased  opening. Peak transaortic valve gradient equals 29.2 mm Hg,  mean transaortic valve gradient equals 16 mm Hg, estimated  aortic valve area equals 0.8 sqcm (by continuity equation),  consistent with severe aortic stenosis. The dimensionless  index is .35.  3. Normal aortic root.  4. Normal left atrium.  5. Mild concentric left ventricular hypertrophy.  6. Normal Left Ventricular Systolic Function,  (EF = 55 to  60%)  7. Grade I diastolic dysfunction  8. Normal right atrium.  9. Normal right ventricular size and function.  10. Unable to estimate RVSP.  11. Normal tricuspid valve.  12. There is mild pulmonic regurgitation.  13. Normal pericardium with no pericardial effusion.    < end of copied text >    [ ]  Catheterization:  [ ] Stress Test:    	  	  LABS:	 	    CARDIAC MARKERS:  CARDIAC MARKERS ( 11 Mar 2018 07:31 )  <0.015 ng/mL / x     / 304 U/L / x     / 1.9 ng/mL  CARDIAC MARKERS ( 10 Mar 2018 16:05 )  <0.015 ng/mL / x     / x     / x     / x      < from: Nuclear Stress Test-Pharmacologic (01.09.17 @ 09:17) >  IMPRESSIONS:Normal Study  * Negative ECG evidence of ischemia after IV  administartion of Regadenoson.  * Myocardial Perfusion SPECT results are normal.  * No clear evidence of ischemia or infarct.  * Post-stress resting myocardial perfusion gated SPECT  imaging was performed (LVEF > 70%) with no regional wall  motion abnormalities.    < end of copied text >                              12.7   6.6   )-----------( 216      ( 10 Mar 2018 16:05 )             38.6     03-11    144  |  108  |  13  ----------------------------<  103<H>  3.2<L>   |  27  |  0.70    Ca    7.0<L>      11 Mar 2018 07:31  Phos  5.7     03-11  Mg     2.2     03-11    TPro  7.6  /  Alb  3.2<L>  /  TBili  0.4  /  DBili  x   /  AST  17  /  ALT  13  /  AlkPhos  73  03-10    proBNP:   Lipid Profile:   HgA1c: Hemoglobin A1C, Whole Blood: 6.4 % (03-10 @ 22:52)    TSH: Thyroid Stimulating Hormone, Serum: 0.36 uU/mL (03-11 @ 07:31)      ASSESSMENT/PLAN: 	83yFemale with HTN, HLD admitted with possible CVA being seen for abnormal ECG and AS.   -pt. has no clinical heart failure on exam  -given prior TTE, would repeat TTE to assess LV function and YANA  -obtain old records - was further workup completed for AS?  -monitor on tele to rule out afib - currently in SR  -f/u MRI  -f/u Neuro  -further workup pending above    Zander Mota MD  [facility]  [address], [id number]  [place] [address]  office: [phone]  pager: [phone]

## 2018-03-11 NOTE — CONSULT NOTE ADULT - ASSESSMENT
Impression:  Slurred speech 3 weeks ago, concerning for possible stroke vs. TIA  UTI     Recommendations:  1.             Admit to telemetry   2.             MRI brain, MRA head without contrast  3.             cw ASA 325mg  and Lipitor 40mg HS  4.             BP goal of normal  5.          Formal speech and swallow evaluation  6.          PT evaluation  7.          STAT CTH IF the patient has sudden change in mental status or neurological exam  8.          DVT PPx  9. treat UTI as per primary team    Thank you for the courtesy of this consult.

## 2018-03-12 LAB
24R-OH-CALCIDIOL SERPL-MCNC: 41.5 NG/ML — SIGNIFICANT CHANGE UP (ref 30–80)
ANION GAP SERPL CALC-SCNC: 9 MMOL/L — SIGNIFICANT CHANGE UP (ref 5–17)
BASOPHILS # BLD AUTO: 0.1 K/UL — SIGNIFICANT CHANGE UP (ref 0–0.2)
BASOPHILS NFR BLD AUTO: 0.9 % — SIGNIFICANT CHANGE UP (ref 0–2)
BUN SERPL-MCNC: 16 MG/DL — SIGNIFICANT CHANGE UP (ref 7–18)
CALCIUM SERPL-MCNC: 7.2 MG/DL — LOW (ref 8.4–10.5)
CHLORIDE SERPL-SCNC: 106 MMOL/L — SIGNIFICANT CHANGE UP (ref 96–108)
CO2 SERPL-SCNC: 28 MMOL/L — SIGNIFICANT CHANGE UP (ref 22–31)
CREAT SERPL-MCNC: 0.86 MG/DL — SIGNIFICANT CHANGE UP (ref 0.5–1.3)
EOSINOPHIL # BLD AUTO: 0.2 K/UL — SIGNIFICANT CHANGE UP (ref 0–0.5)
EOSINOPHIL NFR BLD AUTO: 3.1 % — SIGNIFICANT CHANGE UP (ref 0–6)
GLUCOSE SERPL-MCNC: 104 MG/DL — HIGH (ref 70–99)
HCT VFR BLD CALC: 39.1 % — SIGNIFICANT CHANGE UP (ref 34.5–45)
HGB BLD-MCNC: 12.7 G/DL — SIGNIFICANT CHANGE UP (ref 11.5–15.5)
LYMPHOCYTES # BLD AUTO: 1.7 K/UL — SIGNIFICANT CHANGE UP (ref 1–3.3)
LYMPHOCYTES # BLD AUTO: 26.3 % — SIGNIFICANT CHANGE UP (ref 13–44)
MCHC RBC-ENTMCNC: 30 PG — SIGNIFICANT CHANGE UP (ref 27–34)
MCHC RBC-ENTMCNC: 32.6 GM/DL — SIGNIFICANT CHANGE UP (ref 32–36)
MCV RBC AUTO: 92 FL — SIGNIFICANT CHANGE UP (ref 80–100)
MONOCYTES # BLD AUTO: 0.6 K/UL — SIGNIFICANT CHANGE UP (ref 0–0.9)
MONOCYTES NFR BLD AUTO: 9.4 % — SIGNIFICANT CHANGE UP (ref 2–14)
NEUTROPHILS # BLD AUTO: 3.9 K/UL — SIGNIFICANT CHANGE UP (ref 1.8–7.4)
NEUTROPHILS NFR BLD AUTO: 60.2 % — SIGNIFICANT CHANGE UP (ref 43–77)
PLATELET # BLD AUTO: 210 K/UL — SIGNIFICANT CHANGE UP (ref 150–400)
POTASSIUM SERPL-MCNC: 3.6 MMOL/L — SIGNIFICANT CHANGE UP (ref 3.5–5.3)
POTASSIUM SERPL-SCNC: 3.6 MMOL/L — SIGNIFICANT CHANGE UP (ref 3.5–5.3)
RBC # BLD: 4.24 M/UL — SIGNIFICANT CHANGE UP (ref 3.8–5.2)
RBC # FLD: 13 % — SIGNIFICANT CHANGE UP (ref 10.3–14.5)
SODIUM SERPL-SCNC: 143 MMOL/L — SIGNIFICANT CHANGE UP (ref 135–145)
WBC # BLD: 6.4 K/UL — SIGNIFICANT CHANGE UP (ref 3.8–10.5)
WBC # FLD AUTO: 6.4 K/UL — SIGNIFICANT CHANGE UP (ref 3.8–10.5)

## 2018-03-12 PROCEDURE — 70551 MRI BRAIN STEM W/O DYE: CPT | Mod: 26

## 2018-03-12 RX ORDER — CEFUROXIME AXETIL 250 MG
1 TABLET ORAL
Qty: 6 | Refills: 0 | OUTPATIENT
Start: 2018-03-12 | End: 2018-03-14

## 2018-03-12 RX ORDER — FAMOTIDINE 10 MG/ML
20 INJECTION INTRAVENOUS
Qty: 0 | Refills: 0 | Status: DISCONTINUED | OUTPATIENT
Start: 2018-03-12 | End: 2018-03-13

## 2018-03-12 RX ORDER — IBUPROFEN 200 MG
600 TABLET ORAL EVERY 6 HOURS
Qty: 0 | Refills: 0 | Status: DISCONTINUED | OUTPATIENT
Start: 2018-03-12 | End: 2018-03-13

## 2018-03-12 RX ORDER — ALPRAZOLAM 0.25 MG
0.5 TABLET ORAL ONCE
Qty: 0 | Refills: 0 | Status: DISCONTINUED | OUTPATIENT
Start: 2018-03-12 | End: 2018-03-12

## 2018-03-12 RX ADMIN — ATORVASTATIN CALCIUM 40 MILLIGRAM(S): 80 TABLET, FILM COATED ORAL at 21:33

## 2018-03-12 RX ADMIN — CEFTRIAXONE 100 GRAM(S): 500 INJECTION, POWDER, FOR SOLUTION INTRAMUSCULAR; INTRAVENOUS at 15:03

## 2018-03-12 RX ADMIN — Medication 650 MILLIGRAM(S): at 14:22

## 2018-03-12 RX ADMIN — FAMOTIDINE 20 MILLIGRAM(S): 10 INJECTION INTRAVENOUS at 23:00

## 2018-03-12 RX ADMIN — LISINOPRIL 40 MILLIGRAM(S): 2.5 TABLET ORAL at 06:25

## 2018-03-12 RX ADMIN — Medication 650 MILLIGRAM(S): at 01:36

## 2018-03-12 RX ADMIN — Medication 1 TABLET(S): at 15:18

## 2018-03-12 RX ADMIN — Medication 0.5 MILLIGRAM(S): at 06:25

## 2018-03-12 RX ADMIN — Medication 600 MILLIGRAM(S): at 23:00

## 2018-03-12 RX ADMIN — Medication 650 MILLIGRAM(S): at 13:15

## 2018-03-12 RX ADMIN — Medication 50 MILLIGRAM(S): at 06:25

## 2018-03-12 RX ADMIN — MONTELUKAST 10 MILLIGRAM(S): 4 TABLET, CHEWABLE ORAL at 21:33

## 2018-03-12 RX ADMIN — Medication 0.5 MILLIGRAM(S): at 16:30

## 2018-03-12 RX ADMIN — Medication 100 MICROGRAM(S): at 06:25

## 2018-03-12 RX ADMIN — Medication 650 MILLIGRAM(S): at 02:37

## 2018-03-12 RX ADMIN — Medication 325 MILLIGRAM(S): at 13:15

## 2018-03-12 NOTE — DISCHARGE NOTE ADULT - HOSPITAL COURSE
83 years old female from home, stays alone, ambulates independently, PMH HTN, HLD, Hypothyroidism presents to the ED sent in by PMD to r/o CVA after abnormal carotid US associated w/ episode of  confusion and speech disturbance x 3 weeks ago. W/u included CT head negative for any acute events at ED presentation. Patient reported outpatient CD showed 50-70% stenosis on left side as per patient. However EKG: NSR with RBBB, CE negative x 2, and patient treated w/ aspirin and statin. TTE showed moderate stenosis, G1DD, and hyperdynamic LV function - no significant changes compared to 1/2017 study. CD otherwise negative for stenosis b/l, MRI negative for stroke, and PT eval was not required as patient is back at baseline. Otherwise patient diagnosed w/ UTI (urinary tract infection) 2/2 + UA w/ WBC 11-25 and recent c/o of weakness. Treated w/ Rocephin and discharged w/ Ceftin to complete 5 total days. The other medical conditions managed w/out consequence. Patient seen and examined at bedside with no acute complaints. The medical plan and results were discussed with the patient throughout the hospital course. Patient is medically clear for discharge and is advised to follow up with her primary care physician within a week for continued medical care. Please see above and the medical records for additional information.

## 2018-03-12 NOTE — PROGRESS NOTE ADULT - ASSESSMENT
83 years old female from home, stays alone, ambulates independently, PMH HTN, HLD, Hypothyroidism presents to the ED sent in by PMD to r/o CVA after abnormal carotid US associated w/ episode of  confusion and speech disturbance x 1 week ago  - HCP [phone], Armond Mena

## 2018-03-12 NOTE — DISCHARGE NOTE ADULT - PATIENT PORTAL LINK FT
You can access the [facility] Patient Portal, offered by [facility], by registering with the following website: [email or web address]

## 2018-03-12 NOTE — DISCHARGE NOTE ADULT - PLAN OF CARE
Take your medications as prescribed and follow up within a week with your PCP You presented from your primary care office with symptoms of weakness and speech disturbance 3 weeks ago and an positive outpatient study showing stenosis of your carotid arteries. However inpatient workup showed no stenosis of the arteries and the MRI brain was negative for a stroke. Hence you may have had a transient ischemic attack and you are advised to control your risk factors such as hypertension and hyperlipidemia - please follow up with your primary care physician for continued medical care. Furthermore you have prediabetes with a hemoglobin A1c of 6.4 - the diagnosis is made when it is greater than 6.5, please control and adjust your diet to limit carbohydrate intake. You were found to have a urinary tract infection on your admission. You were treated with antibiotics in the hospital with the goal of clearing this infection. Please complete the oral course mentioned above. Urinary tract infections can cause weakness, confusion, or spread to other organ systems in the body. If you experience continued symptoms of infection (fever, chills, weakness, or burning with urination) please follow up with your primary care provider. You have high blood pressure, and should continue to take your home blood pressure medications as prescribed. If you notice any dizziness or lightheadedness upon standing, please follow up with your primary care provider. If you notice any sudden severe headaches, palpitations, or weakness, please proceed to your nearest emergency department or call 911. You have high cholesterol, and should continue to take your statin as prescribed

## 2018-03-12 NOTE — DISCHARGE NOTE ADULT - CARE PLAN
Principal Discharge DX:	TIA (transient ischemic attack)  Goal:	Take your medications as prescribed and follow up within a week with your PCP  Assessment and plan of treatment:	You presented from your primary care office with symptoms of weakness and speech disturbance 3 weeks ago and an positive outpatient study showing stenosis of your carotid arteries. However inpatient workup showed no stenosis of the arteries and the MRI brain was negative for a stroke. Hence you may have had a transient ischemic attack and you are advised to control your risk factors such as hypertension and hyperlipidemia - please follow up with your primary care physician for continued medical care. Furthermore you have prediabetes with a hemoglobin A1c of 6.4 - the diagnosis is made when it is greater than 6.5, please control and adjust your diet to limit carbohydrate intake.  Secondary Diagnosis:	UTI (urinary tract infection)  Assessment and plan of treatment:	You were found to have a urinary tract infection on your admission. You were treated with antibiotics in the hospital with the goal of clearing this infection. Please complete the oral course mentioned above. Urinary tract infections can cause weakness, confusion, or spread to other organ systems in the body. If you experience continued symptoms of infection (fever, chills, weakness, or burning with urination) please follow up with your primary care provider.  Secondary Diagnosis:	History of hypertension  Assessment and plan of treatment:	You have high blood pressure, and should continue to take your home blood pressure medications as prescribed. If you notice any dizziness or lightheadedness upon standing, please follow up with your primary care provider. If you notice any sudden severe headaches, palpitations, or weakness, please proceed to your nearest emergency department or call 911.  Secondary Diagnosis:	Hyperlipidemia  Assessment and plan of treatment:	You have high cholesterol, and should continue to take your statin as prescribed  Secondary Diagnosis:	Hypothyroidism

## 2018-03-12 NOTE — DISCHARGE NOTE ADULT - MEDICATION SUMMARY - MEDICATIONS TO TAKE
I will START or STAY ON the medications listed below when I get home from the hospital:    aspirin buffered 325 mg oral tablet  -- 1 tab(s) by mouth once a day  -- Indication: For Prophylactic measure    quinapril 40 mg oral tablet  -- 1 tab(s) by mouth once a day  -- Indication: For Hypertension    Lipitor 20 mg oral tablet  -- 1 tab(s) by mouth once a day  -- Indication: For Hyperlipidemia    Xanax  -- 0.5 milligram(s) by mouth 2 times a day  -- Indication: For Anxiety    Toprol-XL 50 mg oral tablet, extended release  -- 1 tab(s) by mouth once a day  -- Indication: For Hypertension    Ceftin 250 mg oral tablet  -- 1 tab(s) by mouth 2 times a day   -- Finish all this medication unless otherwise directed by prescriber.  Medication should be taken with plenty of water.  Take with food or milk.    -- Indication: For UTI (urinary tract infection)    montelukast 10 mg oral tablet  -- 1 tab(s) by mouth once a day  -- Indication: For Asthma    Synthroid  -- 100 microgram(s) by mouth   -- Indication: For Hypothyroidism    Calcium 500+D oral tablet, chewable  -- 1 tab(s) by mouth 2 times a day  -- Indication: For Prophylactic measure I will START or STAY ON the medications listed below when I get home from the hospital:    aspirin buffered 325 mg oral tablet  -- 1 tab(s) by mouth once a day  -- Indication: For Prophylactic measure    quinapril 40 mg oral tablet  -- 1 tab(s) by mouth once a day  -- Indication: For Hypertension    Lipitor 20 mg oral tablet  -- 1 tab(s) by mouth once a day  -- Indication: For Hyperlipidemia    Xanax  -- 0.5 milligram(s) by mouth 2 times a day  -- Indication: For Anxiety    Toprol-XL 50 mg oral tablet, extended release  -- 1 tab(s) by mouth once a day  -- Indication: For Hypertension    Ceftin 250 mg oral tablet  -- 1 tab(s) by mouth 2 times a day   -- Finish all this medication unless otherwise directed by prescriber.  Medication should be taken with plenty of water.  Take with food or milk.    -- Indication: For UTI (urinary tract infection)    montelukast 10 mg oral tablet  -- 1 tab(s) by mouth once a day  -- Indication: For Prophylactic measure    Synthroid  -- 100 microgram(s) by mouth   -- Indication: For Hypothyroidism    Calcium 500+D oral tablet, chewable  -- 1 tab(s) by mouth 2 times a day  -- Indication: For Prophylactic measure

## 2018-03-13 VITALS
DIASTOLIC BLOOD PRESSURE: 83 MMHG | RESPIRATION RATE: 16 BRPM | HEART RATE: 70 BPM | TEMPERATURE: 99 F | SYSTOLIC BLOOD PRESSURE: 140 MMHG | OXYGEN SATURATION: 95 %

## 2018-03-13 PROCEDURE — 93880 EXTRACRANIAL BILAT STUDY: CPT

## 2018-03-13 PROCEDURE — 83605 ASSAY OF LACTIC ACID: CPT

## 2018-03-13 PROCEDURE — 82306 VITAMIN D 25 HYDROXY: CPT

## 2018-03-13 PROCEDURE — 80061 LIPID PANEL: CPT

## 2018-03-13 PROCEDURE — 70551 MRI BRAIN STEM W/O DYE: CPT

## 2018-03-13 PROCEDURE — 83735 ASSAY OF MAGNESIUM: CPT

## 2018-03-13 PROCEDURE — 84100 ASSAY OF PHOSPHORUS: CPT

## 2018-03-13 PROCEDURE — 80053 COMPREHEN METABOLIC PANEL: CPT

## 2018-03-13 PROCEDURE — 80048 BASIC METABOLIC PNL TOTAL CA: CPT

## 2018-03-13 PROCEDURE — 84484 ASSAY OF TROPONIN QUANT: CPT

## 2018-03-13 PROCEDURE — 85610 PROTHROMBIN TIME: CPT

## 2018-03-13 PROCEDURE — 85730 THROMBOPLASTIN TIME PARTIAL: CPT

## 2018-03-13 PROCEDURE — 99285 EMERGENCY DEPT VISIT HI MDM: CPT | Mod: 25

## 2018-03-13 PROCEDURE — 70450 CT HEAD/BRAIN W/O DYE: CPT

## 2018-03-13 PROCEDURE — 82607 VITAMIN B-12: CPT

## 2018-03-13 PROCEDURE — 84443 ASSAY THYROID STIM HORMONE: CPT

## 2018-03-13 PROCEDURE — 82746 ASSAY OF FOLIC ACID SERUM: CPT

## 2018-03-13 PROCEDURE — 82550 ASSAY OF CK (CPK): CPT

## 2018-03-13 PROCEDURE — 85027 COMPLETE CBC AUTOMATED: CPT

## 2018-03-13 PROCEDURE — 81001 URINALYSIS AUTO W/SCOPE: CPT

## 2018-03-13 PROCEDURE — 93306 TTE W/DOPPLER COMPLETE: CPT

## 2018-03-13 PROCEDURE — 87086 URINE CULTURE/COLONY COUNT: CPT

## 2018-03-13 PROCEDURE — 82553 CREATINE MB FRACTION: CPT

## 2018-03-13 PROCEDURE — 93005 ELECTROCARDIOGRAM TRACING: CPT

## 2018-03-13 PROCEDURE — 83036 HEMOGLOBIN GLYCOSYLATED A1C: CPT

## 2018-03-13 RX ORDER — ALPRAZOLAM 0.25 MG
0.5 TABLET ORAL ONCE
Qty: 0 | Refills: 0 | Status: DISCONTINUED | OUTPATIENT
Start: 2018-03-13 | End: 2018-03-13

## 2018-03-13 RX ADMIN — Medication 0.5 MILLIGRAM(S): at 05:51

## 2018-03-13 RX ADMIN — Medication 50 MILLIGRAM(S): at 05:52

## 2018-03-13 RX ADMIN — Medication 100 MICROGRAM(S): at 05:51

## 2018-03-13 RX ADMIN — Medication 600 MILLIGRAM(S): at 00:23

## 2018-03-13 RX ADMIN — Medication 0.5 MILLIGRAM(S): at 02:23

## 2018-03-13 RX ADMIN — Medication 1 TABLET(S): at 12:33

## 2018-03-13 RX ADMIN — Medication 325 MILLIGRAM(S): at 12:33

## 2018-03-13 RX ADMIN — LISINOPRIL 40 MILLIGRAM(S): 2.5 TABLET ORAL at 05:52

## 2018-03-13 RX ADMIN — CEFTRIAXONE 100 GRAM(S): 500 INJECTION, POWDER, FOR SOLUTION INTRAMUSCULAR; INTRAVENOUS at 12:32

## 2018-03-13 RX ADMIN — FAMOTIDINE 20 MILLIGRAM(S): 10 INJECTION INTRAVENOUS at 05:52

## 2018-03-13 NOTE — PROGRESS NOTE ADULT - PROBLEM SELECTOR PLAN 4
Lipid profile noted; c/w Lipitor
Lipid profile noted; LDL 64, c/w Lipitor
Lipid profile noted; c/w Lipitor

## 2018-03-13 NOTE — PROGRESS NOTE ADULT - ATTENDING COMMENTS
Patient seen and examined, agree with above assessment and plan as transcribed above.    - No need for further inpatient cardiac w/u  - Remainder of cardiac eval can be done in our office, f/u with us in 1-2 weeks    Erich Ramirez MD, [facility]  [facility], [facility]  [address] [place] [address]  PHONE:  [phone]  BEEPER : [phone]
patient was seen and evaluated, agreed above treatment plan
patient was seen and evaluated, agreed above treatment plan

## 2018-03-13 NOTE — PROGRESS NOTE ADULT - PROBLEM SELECTOR PLAN 3
BP elevated  - C/w home BP meds; Toprol XL (increased dose to 100) and Quinapril at home dose
BP elevated  - C/w home BP meds; Toprol XL (increased dose to 100) and Quinapril at home dose
BP stable  - C/w Toprol XL (increased dose to 100) and Quinapril at home dose

## 2018-03-13 NOTE — PROGRESS NOTE ADULT - PROBLEM SELECTOR PLAN 2
+ UA w/ WBC 11-25 and recent c/o of weakness  - Rocephin Day 2  ***Pending UCx
+ UA w/ WBC 11-25 and recent c/o of weakness  - Starting Rocephin Day 1  ***Pending UCx
+ UA w/ WBC 11-25 and recent c/o of weakness but no urinary complaints  - Rocephin Day 3  ***UCx; contaminated but afebrile and no WBC count

## 2018-03-13 NOTE — PROGRESS NOTE ADULT - PROBLEM SELECTOR PLAN 5
C/w Synthroid at home dose; pending TSH
C/w Synthroid at home dose; TSH wnls
C/w Synthroid at home dose; pending TSH

## 2018-03-13 NOTE — PROGRESS NOTE ADULT - PROBLEM SELECTOR PLAN 1
Recent episode of confusion and funny speech which resolved and followed w/ generalized weakness over past week  - CT head negative for any acute events  - Outpatient CD reported 50-70% stenosis on left side as per patient  - EKG: NSR with RBBB, CE negative x 2, HgA1c 6.4; prediabetic  - C/w aspirin and statin  - CD/MRI negative and PT eval recommends no rehab  - TTE no change from previous, G1DD, moderate AS, and hyperdynamic LV function w/ EF > 70%  Neurology consulted; Dr. Bell  Cardiology Dr. Ramirez
Recent episode of confusion and funny speech which resolved and followed w/ generalized weakness over past week  - CT head negative for any acute events  - Outpatient CD showed 50-70% stenosis on left side as per patient  - EKG: NSR with RBBB  - CE negative x 2  - C/w aspirin and statin  ***Pending TTE, CD, MRI, and PT eval  Neurology consulted; Dr. Bell  Cardiology Dr. Mota
Recent episode of confusion and funny speech which resolved and followed w/ generalized weakness over past week  - CT head negative for any acute events  - Outpatient CD showed 50-70% stenosis on left side as per patient  - EKG: NSR with RBBB  - CE negative x 2  - C/w aspirin and statin  ***Pending TTE, CD, MRI, and PT eval  Neurology consulted; Dr. Bell  Cardiology Dr. Mota

## 2018-03-13 NOTE — PROGRESS NOTE ADULT - SUBJECTIVE AND OBJECTIVE BOX
PGY 1 Note discussed with supervising resident and primary attending    Patient is a 83y old  Female who presents with a chief complaint of sent by PCP to r/o CVA (10 Mar 2018 20:11)      INTERVAL HPI/OVERNIGHT EVENTS: Patient seen and examined at bedside with no new complaints - pending w/u    MEDICATIONS  (STANDING):  ALPRAZolam 0.5 milliGRAM(s) Oral two times a day  aspirin 325 milliGRAM(s) Oral daily  atorvastatin 40 milliGRAM(s) Oral at bedtime  calcium carbonate  625 mG + Vitamin D (OsCal 250 + D) 1 Tablet(s) Oral daily  levothyroxine 100 MICROGram(s) Oral daily  lisinopril 40 milliGRAM(s) Oral daily  metoprolol succinate ER 50 milliGRAM(s) Oral daily  montelukast 10 milliGRAM(s) Oral daily  potassium chloride   Powder 40 milliEquivalent(s) Oral once    MEDICATIONS  (PRN):  acetaminophen   Tablet. 650 milliGRAM(s) Oral every 6 hours PRN Mild Pain (1 - 3)      __________________________________________________  REVIEW OF SYSTEMS:  RESPIRATORY: No cough; No shortness of breath  CARDIOVASCULAR: No chest pain, no palpitations  GASTROINTESTINAL: No pain. No nausea or vomiting; No diarrhea       Vital Signs Last 24 Hrs  T(C): 37 (11 Mar 2018 07:31), Max: 37 (11 Mar 2018 07:31)  T(F): 98.6 (11 Mar 2018 07:31), Max: 98.6 (11 Mar 2018 07:31)  HR: 58 (11 Mar 2018 07:31) (56 - 66)  BP: 170/62 (11 Mar 2018 07:31) (143/70 - 191/66)  BP(mean): --  RR: 18 (11 Mar 2018 07:31) (17 - 18)  SpO2: 98% (11 Mar 2018 07:31) (94% - 99%)    ________________________________________________  PHYSICAL EXAM:  GENERAL: NAD  HEENT: Normocephalic;  conjunctivae and sclerae clear; moist mucous membranes;   NECK : supple  CHEST/LUNG: Clear to auscultation bilaterally with good air entry   HEART: S1 S2  regular; no murmurs, gallops or rubs  ABDOMEN: Soft, Nontender, Nondistended; Bowel sounds present  EXTREMITIES: No cyanosis; no edema; no calf tenderness  NERVOUS SYSTEM:  Awake and alert; Oriented  to place, person and time ; no new deficits    _________________________________________________  LABS:                        12.7   6.6   )-----------( 216      ( 10 Mar 2018 16:05 )             38.6     03-11    144  |  108  |  13  ----------------------------<  103<H>  3.2<L>   |  27  |  0.70    Ca    7.0<L>      11 Mar 2018 07:31  Phos  5.7     03-11  Mg     2.2     -11    TPro  7.6  /  Alb  3.2<L>  /  TBili  0.4  /  DBili  x   /  AST  17  /  ALT  13  /  AlkPhos  73  03-10    PT/INR - ( 10 Mar 2018 16:05 )   PT: 12.1 sec;   INR: 1.11 ratio         PTT - ( 10 Mar 2018 16:05 )  PTT:30.2 sec  Urinalysis Basic - ( 10 Mar 2018 18:05 )    Color: Yellow / Appearance: Clear / S.005 / pH: x  Gluc: x / Ketone: Negative  / Bili: Negative / Urobili: Negative   Blood: x / Protein: 15 / Nitrite: Negative   Leuk Esterase: Moderate / RBC: 2-5 /HPF / WBC 11-25 /HPF   Sq Epi: x / Non Sq Epi: Many /HPF / Bacteria: Moderate /HPF      CAPILLARY BLOOD GLUCOSE            RADIOLOGY & ADDITIONAL TESTS:    Imaging Personally Reviewed:  YES    Consultant(s) Notes Reviewed:   YES    Care Discussed with Consultants : YES    Plan of care was discussed with patient and /or primary care giver; all questions and concerns were addressed and care was aligned with patient's wishes.
PGY 1 Note discussed with supervising resident and primary attending    Patient is a 83y old  Female who presents with a chief complaint of sent by PCP to r/o CVA (12 Mar 2018 14:26)      INTERVAL HPI/OVERNIGHT EVENTS: Patient seen and examined at bedside with no new complaints - negative MRI, pending discharge, patient anxious about being home alone - Nephew and  made aware    MEDICATIONS  (STANDING):  ALPRAZolam 0.5 milliGRAM(s) Oral two times a day  aspirin 325 milliGRAM(s) Oral daily  atorvastatin 40 milliGRAM(s) Oral at bedtime  calcium carbonate  625 mG + Vitamin D (OsCal 250 + D) 1 Tablet(s) Oral daily  cefTRIAXone   IVPB      cefTRIAXone   IVPB 1 Gram(s) IV Intermittent every 24 hours  famotidine    Tablet 20 milliGRAM(s) Oral two times a day  levothyroxine 100 MICROGram(s) Oral daily  lisinopril 40 milliGRAM(s) Oral daily  metoprolol succinate ER 50 milliGRAM(s) Oral daily  montelukast 10 milliGRAM(s) Oral at bedtime    MEDICATIONS  (PRN):  acetaminophen   Tablet. 650 milliGRAM(s) Oral every 6 hours PRN Mild Pain (1 - 3)  ibuprofen  Tablet 600 milliGRAM(s) Oral every 6 hours PRN pain 6-10      __________________________________________________  REVIEW OF SYSTEMS:  CONSTITUTIONAL: No fever, +weakness  EYES: No acute visual disturbances  NECK: No pain or stiffness  RESPIRATORY: No cough; No shortness of breath  CARDIOVASCULAR: No chest pain, no palpitations  GASTROINTESTINAL: No pain. No nausea or vomiting; No diarrhea   NEUROLOGICAL: No headache or numbness, no tremors  MUSCULOSKELETAL: No joint pain, no muscle pain  GENITOURINARY: No dysuria, no frequency, no hesitancy  PSYCHIATRY: No depression, +anxiety  ALL OTHER  ROS negative        Vital Signs Last 24 Hrs  T(C): 36.6 (13 Mar 2018 05:26), Max: 37.1 (12 Mar 2018 08:03)  T(F): 97.8 (13 Mar 2018 05:26), Max: 98.8 (12 Mar 2018 19:47)  HR: 64 (13 Mar 2018 05:26) (56 - 71)  BP: 174/50 (13 Mar 2018 05:26) (142/70 - 176/72)  BP(mean): --  RR: 16 (13 Mar 2018 05:26) (14 - 18)  SpO2: 97% (13 Mar 2018 05:26) (96% - 99%)    ________________________________________________  PHYSICAL EXAM:  GENERAL: NAD  HEENT: Normocephalic;  conjunctivae and sclerae clear; moist mucous membranes;   NECK : supple  CHEST/LUNG: Clear to auscultation bilaterally with good air entry   HEART: S1 S2  regular; no murmurs, gallops or rubs  ABDOMEN: Soft, Nontender, Nondistended; Bowel sounds present  EXTREMITIES: No cyanosis; no edema; no calf tenderness  NERVOUS SYSTEM:  Awake and alert; Oriented  to place, person and time ; no new deficits    _________________________________________________  LABS:                        12.7   6.4   )-----------( 210      ( 12 Mar 2018 07:05 )             39.1     03-12    143  |  106  |  16  ----------------------------<  104<H>  3.6   |  28  |  0.86    Ca    7.2<L>      12 Mar 2018 07:05  Phos  5.7     03-11  Mg     2.2     03-11          CAPILLARY BLOOD GLUCOSE            RADIOLOGY & ADDITIONAL TESTS:    Imaging Personally Reviewed:  YES    Consultant(s) Notes Reviewed:   YES    Care Discussed with Consultants : YES    Plan of care was discussed with patient and /or primary care giver; all questions and concerns were addressed and care was aligned with patient's wishes.
Patient denies CP, SOB, still dizzy ROS (-)  	  MEDICATIONS:  MEDICATIONS  (STANDING):  ALPRAZolam 0.5 milliGRAM(s) Oral two times a day  aspirin 325 milliGRAM(s) Oral daily  atorvastatin 40 milliGRAM(s) Oral at bedtime  calcium carbonate  625 mG + Vitamin D (OsCal 250 + D) 1 Tablet(s) Oral daily  cefTRIAXone   IVPB      cefTRIAXone   IVPB 1 Gram(s) IV Intermittent every 24 hours  levothyroxine 100 MICROGram(s) Oral daily  lisinopril 40 milliGRAM(s) Oral daily  metoprolol succinate ER 50 milliGRAM(s) Oral daily  montelukast 10 milliGRAM(s) Oral at bedtime      LABS:	 	    CARDIAC MARKERS:  CARDIAC MARKERS ( 11 Mar 2018 07:31 )  <0.015 ng/mL / x     / 304 U/L / x     / 1.9 ng/mL  CARDIAC MARKERS ( 10 Mar 2018 16:05 )  <0.015 ng/mL / x     / x     / x     / x                                    12.7   6.4   )-----------( 210      ( 12 Mar 2018 07:05 )             39.1     Hemoglobin: 12.7 g/dL (03-12 @ 07:05)  Hemoglobin: 12.7 g/dL (03-10 @ 16:05)      03-12    143  |  106  |  16  ----------------------------<  104<H>  3.6   |  28  |  0.86    Ca    7.2<L>      12 Mar 2018 07:05  Phos  5.7     03-11  Mg     2.2     03-11    TPro  7.6  /  Alb  3.2<L>  /  TBili  0.4  /  DBili  x   /  AST  17  /  ALT  13  /  AlkPhos  73  03-10    Creatinine Trend: 0.86<--, 0.70<--, 0.84<--        PHYSICAL EXAM:  T(C): 37.1 (03-12-18 @ 08:03), Max: 37.1 (03-11-18 @ 16:12)  HR: 70 (03-12-18 @ 08:03) (56 - 70)  BP: 154/67 (03-12-18 @ 08:03) (153/64 - 181/82)  RR: 18 (03-12-18 @ 08:03) (17 - 18)  SpO2: 99% (03-12-18 @ 08:03) (98% - 99%)  Wt(kg): --  I&O's Summary        HEENT:   Normal oral mucosa, PERRL, EOMI	  Lymphatic: No obvious lymphadenopathy , no edema  Cardiovascular: Normal S1 S2, No JVD, 1/6 KIRA murmur, Peripheral pulses palpable 2+ bilaterally  Respiratory: Lungs clear to auscultation, normal effort 	  Gastrointestinal:  Soft, Non-tender, + BS	  Skin: No rashes,  No cyanosis, warm to touch  Musculoskeletal: Normal range of motion, normal strength  Psychiatry:  Appropriate Mood & affect     TELEMETRY: 	Sinus        ASSESSMENT/PLAN: 	83y Female HTN, HLD admitted with possible CVA being seen for abnormal ECG and aortic stenosis.    - Patient with dizziness and no correlation with an arrhythmia while on tele  - F/u MRI  - Awaiting Echo  - Neuro f/u    Erich Ramirez MD, [facility]  [facility], [facility]  [address] [place] [address]  PHONE:  [phone]  BEEPER : [phone]
pt seen and examined, no complaints on exam.   	  acetaminophen   Tablet. 650 milliGRAM(s) Oral every 6 hours PRN  ALPRAZolam 0.5 milliGRAM(s) Oral two times a day  aspirin 325 milliGRAM(s) Oral daily  atorvastatin 40 milliGRAM(s) Oral at bedtime  calcium carbonate  625 mG + Vitamin D (OsCal 250 + D) 1 Tablet(s) Oral daily  cefTRIAXone   IVPB      cefTRIAXone   IVPB 1 Gram(s) IV Intermittent every 24 hours  famotidine    Tablet 20 milliGRAM(s) Oral two times a day  ibuprofen  Tablet 600 milliGRAM(s) Oral every 6 hours PRN  levothyroxine 100 MICROGram(s) Oral daily  lisinopril 40 milliGRAM(s) Oral daily  metoprolol succinate ER 50 milliGRAM(s) Oral daily  montelukast 10 milliGRAM(s) Oral at bedtime                            12.7   6.4   )-----------( 210      ( 12 Mar 2018 07:05 )             39.1       Hemoglobin: 12.7 g/dL (03-12 @ 07:05)  Hemoglobin: 12.7 g/dL (03-10 @ 16:05)      03-12    143  |  106  |  16  ----------------------------<  104<H>  3.6   |  28  |  0.86    Ca    7.2<L>      12 Mar 2018 07:05      Creatinine Trend: 0.86<--, 0.70<--, 0.84<--    COAGS:     CARDIAC MARKERS ( 11 Mar 2018 07:31 )  <0.015 ng/mL / x     / 304 U/L / x     / 1.9 ng/mL  CARDIAC MARKERS ( 10 Mar 2018 16:05 )  <0.015 ng/mL / x     / x     / x     / x            T(C): 36.6 (03-13-18 @ 05:26), Max: 37.1 (03-12-18 @ 08:03)  HR: 64 (03-13-18 @ 05:26) (56 - 71)  BP: 174/50 (03-13-18 @ 05:26) (142/70 - 176/72)  RR: 16 (03-13-18 @ 05:26) (14 - 18)  SpO2: 97% (03-13-18 @ 05:26) (96% - 99%)  Wt(kg): --    I&O's Summary    HEENT:   Normal oral mucosa, PERRL, EOMI	  Lymphatic: No obvious lymphadenopathy , no edema  Cardiovascular: Normal S1 S2, No JVD, 1/6 KIRA murmur, Peripheral pulses palpable 2+ bilaterally  Respiratory: Lungs clear to auscultation, normal effort 	  Gastrointestinal:  Soft, Non-tender, + BS	  Skin: No rashes,  No cyanosis, warm to touch  Musculoskeletal: Normal range of motion, normal strength  Psychiatry:  Appropriate Mood & affect     TELEMETRY: 	Sinus     echo: < from: Transthoracic Echocardiogram (03.11.18 @ 06:45) >  CONCLUSIONS:  1. Mild posterior mitral annular calcification. Trace  mitral regurgitation.  2. Calcified aortic valve is not well visualized. Moderate  aortic stenosis. It is possible that degree of AS is  underestimated by measured gradients.  Trace aortic  regurgitation.  3. Normal aortic root.  4. Normal left atrium.  5. Mild left ventricular enlargement.  6. Hyperdynamic left ventricular systolic function (EF  >70%).  7. Grade I diastolic dysfunction (Impaired relaxation).  8. Normal right atrium.  9. Normal right ventricular size and systolic function  (TAPSE 2.1 cm).  10. RV systolic pressure is normal at  18 mm Hg.  11. Normal tricuspid valve. Trace tricuspid regurgitation.  12. Pulmonic valve not well seen. Trace pulmonic  insufficiency is noted.  13. No pericardial effusion.    *** Compared with echocardiogram report of 1/9/2017, no  significant changes noted.    < end of copied text >    MRI Head: < from: MR Head No Cont (03.12.18 @ 13:11) >    IMPRESSION:  No acute intracranial hemorrhage or acute infarct.      < end of copied text >      ASSESSMENT/PLAN: 	83y Female HTN, HLD admitted with possible CVA being seen for abnormal ECG and aortic stenosis.    ASA, statin   cont ABX   echo with no change from 1/2017 echo : MOD AS, NL lv fx    GI / DVT prophylaxis.   keep K>4, mag >2.0   rate control with BB   Neuro f/u  D/W Dr Ramirez
PGY 1 Note discussed with supervising resident and primary attending    Patient is a 83y old  Female who presents with a chief complaint of sent by PCP to r/o CVA (10 Mar 2018 20:11)      INTERVAL HPI/OVERNIGHT EVENTS: Patient seen and examined at bedside with no new complaints    MEDICATIONS  (STANDING):  ALPRAZolam 0.5 milliGRAM(s) Oral two times a day  aspirin 325 milliGRAM(s) Oral daily  atorvastatin 40 milliGRAM(s) Oral at bedtime  calcium carbonate  625 mG + Vitamin D (OsCal 250 + D) 1 Tablet(s) Oral daily  cefTRIAXone   IVPB      cefTRIAXone   IVPB 1 Gram(s) IV Intermittent every 24 hours  levothyroxine 100 MICROGram(s) Oral daily  lisinopril 40 milliGRAM(s) Oral daily  metoprolol succinate ER 50 milliGRAM(s) Oral daily  montelukast 10 milliGRAM(s) Oral daily    MEDICATIONS  (PRN):  acetaminophen   Tablet. 650 milliGRAM(s) Oral every 6 hours PRN Mild Pain (1 - 3)      __________________________________________________  REVIEW OF SYSTEMS:    CONSTITUTIONAL: No fever,   EYES: No acute visual disturbances  NECK: No pain or stiffness  RESPIRATORY: No cough; No shortness of breath  CARDIOVASCULAR: No chest pain, no palpitations  GASTROINTESTINAL: No pain. No nausea or vomiting; No diarrhea   NEUROLOGICAL: No headache or numbness, no tremors  MUSCULOSKELETAL: No joint pain, no muscle pain  GENITOURINARY: No dysuria, no frequency, no hesitancy  PSYCHIATRY: No depression , no anxiety  ALL OTHER  ROS negative        Vital Signs Last 24 Hrs  T(C): 37 (12 Mar 2018 04:52), Max: 37.1 (11 Mar 2018 16:12)  T(F): 98.6 (12 Mar 2018 04:52), Max: 98.8 (11 Mar 2018 16:12)  HR: 64 (12 Mar 2018 04:52) (55 - 64)  BP: 157/70 (12 Mar 2018 04:52) (153/64 - 181/82)  BP(mean): --  RR: 17 (12 Mar 2018 04:52) (17 - 18)  SpO2: 99% (12 Mar 2018 04:52) (98% - 100%)    ________________________________________________  PHYSICAL EXAM:  GENERAL: NAD  HEENT: Normocephalic;  conjunctivae and sclerae clear; moist mucous membranes;   NECK : supple  CHEST/LUNG: Clear to auscultation bilaterally with good air entry   HEART: S1 S2  regular; no murmurs, gallops or rubs  ABDOMEN: Soft, Nontender, Nondistended; Bowel sounds present  EXTREMITIES: No cyanosis; no edema; no calf tenderness  NERVOUS SYSTEM:  Awake and alert; Oriented  to place, person and time ; no new deficits    _________________________________________________  LABS:                        12.7   6.6   )-----------( 216      ( 10 Mar 2018 16:05 )             38.6     03-11    144  |  108  |  13  ----------------------------<  103<H>  3.2<L>   |  27  |  0.70    Ca    7.0<L>      11 Mar 2018 07:31  Phos  5.7     03-11  Mg     2.2     03-11    TPro  7.6  /  Alb  3.2<L>  /  TBili  0.4  /  DBili  x   /  AST  17  /  ALT  13  /  AlkPhos  73  03-10    PT/INR - ( 10 Mar 2018 16:05 )   PT: 12.1 sec;   INR: 1.11 ratio         PTT - ( 10 Mar 2018 16:05 )  PTT:30.2 sec  Urinalysis Basic - ( 10 Mar 2018 18:05 )    Color: Yellow / Appearance: Clear / S.005 / pH: x  Gluc: x / Ketone: Negative  / Bili: Negative / Urobili: Negative   Blood: x / Protein: 15 / Nitrite: Negative   Leuk Esterase: Moderate / RBC: 2-5 /HPF / WBC 11-25 /HPF   Sq Epi: x / Non Sq Epi: Many /HPF / Bacteria: Moderate /HPF      CAPILLARY BLOOD GLUCOSE            RADIOLOGY & ADDITIONAL TESTS:    Imaging Personally Reviewed:  YES    Consultant(s) Notes Reviewed:   YES    Care Discussed with Consultants : YES    Plan of care was discussed with patient and /or primary care giver; all questions and concerns were addressed and care was aligned with patient's wishes.

## 2018-03-13 NOTE — PROGRESS NOTE ADULT - ASSESSMENT
83 years old female from home, stays alone, ambulates independently, PMH HTN, HLD, Hypothyroidism presents to the ED sent in by PMD to r/o CVA after abnormal carotid US associated w/ episode of confusion and speech disturbance x 3 weeks ago  - HCP [phone], Armond Mena; Nephritu

## 2018-03-13 NOTE — PROGRESS NOTE ADULT - PROBLEM SELECTOR PROBLEM 1
R/O Cerebrovascular accident (CVA), unspecified mechanism

## 2018-04-18 ENCOUNTER — INPATIENT (INPATIENT)
Facility: HOSPITAL | Age: 83
LOS: 3 days | Discharge: HOME | DRG: 312 | End: 2018-04-22
Attending: INTERNAL MEDICINE | Admitting: INTERNAL MEDICINE
Payer: MEDICARE

## 2018-04-18 VITALS
TEMPERATURE: 97 F | RESPIRATION RATE: 20 BRPM | DIASTOLIC BLOOD PRESSURE: 78 MMHG | HEART RATE: 57 BPM | SYSTOLIC BLOOD PRESSURE: 145 MMHG | WEIGHT: 160.06 LBS | OXYGEN SATURATION: 98 %

## 2018-04-18 DIAGNOSIS — R42 DIZZINESS AND GIDDINESS: ICD-10-CM

## 2018-04-18 DIAGNOSIS — F41.9 ANXIETY DISORDER, UNSPECIFIED: ICD-10-CM

## 2018-04-18 DIAGNOSIS — Z98.890 OTHER SPECIFIED POSTPROCEDURAL STATES: Chronic | ICD-10-CM

## 2018-04-18 DIAGNOSIS — Z86.79 PERSONAL HISTORY OF OTHER DISEASES OF THE CIRCULATORY SYSTEM: ICD-10-CM

## 2018-04-18 DIAGNOSIS — Z29.9 ENCOUNTER FOR PROPHYLACTIC MEASURES, UNSPECIFIED: ICD-10-CM

## 2018-04-18 DIAGNOSIS — E03.9 HYPOTHYROIDISM, UNSPECIFIED: ICD-10-CM

## 2018-04-18 LAB
ALBUMIN SERPL ELPH-MCNC: 3.7 G/DL — SIGNIFICANT CHANGE UP (ref 3.5–5)
ALP SERPL-CCNC: 77 U/L — SIGNIFICANT CHANGE UP (ref 40–120)
ALT FLD-CCNC: 17 U/L DA — SIGNIFICANT CHANGE UP (ref 10–60)
ANION GAP SERPL CALC-SCNC: 11 MMOL/L — SIGNIFICANT CHANGE UP (ref 5–17)
APPEARANCE UR: CLEAR — SIGNIFICANT CHANGE UP
AST SERPL-CCNC: 18 U/L — SIGNIFICANT CHANGE UP (ref 10–40)
BASOPHILS # BLD AUTO: 0.1 K/UL — SIGNIFICANT CHANGE UP (ref 0–0.2)
BASOPHILS NFR BLD AUTO: 1.8 % — SIGNIFICANT CHANGE UP (ref 0–2)
BILIRUB SERPL-MCNC: 0.5 MG/DL — SIGNIFICANT CHANGE UP (ref 0.2–1.2)
BILIRUB UR-MCNC: NEGATIVE — SIGNIFICANT CHANGE UP
BUN SERPL-MCNC: 16 MG/DL — SIGNIFICANT CHANGE UP (ref 7–18)
CALCIUM SERPL-MCNC: 8.3 MG/DL — LOW (ref 8.4–10.5)
CHLORIDE SERPL-SCNC: 104 MMOL/L — SIGNIFICANT CHANGE UP (ref 96–108)
CO2 SERPL-SCNC: 25 MMOL/L — SIGNIFICANT CHANGE UP (ref 22–31)
COLOR SPEC: YELLOW — SIGNIFICANT CHANGE UP
CREAT SERPL-MCNC: 0.94 MG/DL — SIGNIFICANT CHANGE UP (ref 0.5–1.3)
DIFF PNL FLD: ABNORMAL
EOSINOPHIL # BLD AUTO: 0.1 K/UL — SIGNIFICANT CHANGE UP (ref 0–0.5)
EOSINOPHIL NFR BLD AUTO: 1.7 % — SIGNIFICANT CHANGE UP (ref 0–6)
GLUCOSE SERPL-MCNC: 89 MG/DL — SIGNIFICANT CHANGE UP (ref 70–99)
GLUCOSE UR QL: NEGATIVE — SIGNIFICANT CHANGE UP
HCT VFR BLD CALC: 42.8 % — SIGNIFICANT CHANGE UP (ref 34.5–45)
HGB BLD-MCNC: 13.9 G/DL — SIGNIFICANT CHANGE UP (ref 11.5–15.5)
KETONES UR-MCNC: NEGATIVE — SIGNIFICANT CHANGE UP
LEUKOCYTE ESTERASE UR-ACNC: ABNORMAL
LYMPHOCYTES # BLD AUTO: 1.8 K/UL — SIGNIFICANT CHANGE UP (ref 1–3.3)
LYMPHOCYTES # BLD AUTO: 23.1 % — SIGNIFICANT CHANGE UP (ref 13–44)
MCHC RBC-ENTMCNC: 29.4 PG — SIGNIFICANT CHANGE UP (ref 27–34)
MCHC RBC-ENTMCNC: 32.5 GM/DL — SIGNIFICANT CHANGE UP (ref 32–36)
MCV RBC AUTO: 90.4 FL — SIGNIFICANT CHANGE UP (ref 80–100)
MONOCYTES # BLD AUTO: 0.6 K/UL — SIGNIFICANT CHANGE UP (ref 0–0.9)
MONOCYTES NFR BLD AUTO: 7.9 % — SIGNIFICANT CHANGE UP (ref 2–14)
NEUTROPHILS # BLD AUTO: 5.1 K/UL — SIGNIFICANT CHANGE UP (ref 1.8–7.4)
NEUTROPHILS NFR BLD AUTO: 65.5 % — SIGNIFICANT CHANGE UP (ref 43–77)
NITRITE UR-MCNC: NEGATIVE — SIGNIFICANT CHANGE UP
PH UR: 8 — SIGNIFICANT CHANGE UP (ref 5–8)
PLATELET # BLD AUTO: 247 K/UL — SIGNIFICANT CHANGE UP (ref 150–400)
POTASSIUM SERPL-MCNC: 4.2 MMOL/L — SIGNIFICANT CHANGE UP (ref 3.5–5.3)
POTASSIUM SERPL-SCNC: 4.2 MMOL/L — SIGNIFICANT CHANGE UP (ref 3.5–5.3)
PROT SERPL-MCNC: 8.7 G/DL — HIGH (ref 6–8.3)
PROT UR-MCNC: NEGATIVE — SIGNIFICANT CHANGE UP
RBC # BLD: 4.73 M/UL — SIGNIFICANT CHANGE UP (ref 3.8–5.2)
RBC # FLD: 13.2 % — SIGNIFICANT CHANGE UP (ref 10.3–14.5)
SODIUM SERPL-SCNC: 140 MMOL/L — SIGNIFICANT CHANGE UP (ref 135–145)
SP GR SPEC: 1.01 — SIGNIFICANT CHANGE UP (ref 1.01–1.02)
TROPONIN I SERPL-MCNC: <0.015 NG/ML — SIGNIFICANT CHANGE UP (ref 0–0.04)
UROBILINOGEN FLD QL: NEGATIVE — SIGNIFICANT CHANGE UP
WBC # BLD: 7.8 K/UL — SIGNIFICANT CHANGE UP (ref 3.8–10.5)
WBC # FLD AUTO: 7.8 K/UL — SIGNIFICANT CHANGE UP (ref 3.8–10.5)

## 2018-04-18 PROCEDURE — 70450 CT HEAD/BRAIN W/O DYE: CPT | Mod: 26

## 2018-04-18 PROCEDURE — 99285 EMERGENCY DEPT VISIT HI MDM: CPT

## 2018-04-18 RX ORDER — ASPIRIN/CALCIUM CARB/MAGNESIUM 324 MG
81 TABLET ORAL DAILY
Qty: 0 | Refills: 0 | Status: DISCONTINUED | OUTPATIENT
Start: 2018-04-18 | End: 2018-04-22

## 2018-04-18 RX ORDER — ALPRAZOLAM 0.25 MG
0.5 TABLET ORAL
Qty: 0 | Refills: 0 | Status: DISCONTINUED | OUTPATIENT
Start: 2018-04-18 | End: 2018-04-22

## 2018-04-18 RX ORDER — METOPROLOL TARTRATE 50 MG
50 TABLET ORAL DAILY
Qty: 0 | Refills: 0 | Status: DISCONTINUED | OUTPATIENT
Start: 2018-04-18 | End: 2018-04-22

## 2018-04-18 RX ORDER — SODIUM CHLORIDE 9 MG/ML
500 INJECTION INTRAMUSCULAR; INTRAVENOUS; SUBCUTANEOUS ONCE
Qty: 0 | Refills: 0 | Status: COMPLETED | OUTPATIENT
Start: 2018-04-18 | End: 2018-04-18

## 2018-04-18 RX ORDER — MONTELUKAST 4 MG/1
10 TABLET, CHEWABLE ORAL DAILY
Qty: 0 | Refills: 0 | Status: DISCONTINUED | OUTPATIENT
Start: 2018-04-18 | End: 2018-04-22

## 2018-04-18 RX ORDER — LISINOPRIL 2.5 MG/1
40 TABLET ORAL DAILY
Qty: 0 | Refills: 0 | Status: DISCONTINUED | OUTPATIENT
Start: 2018-04-18 | End: 2018-04-19

## 2018-04-18 RX ORDER — LEVOTHYROXINE SODIUM 125 MCG
100 TABLET ORAL DAILY
Qty: 0 | Refills: 0 | Status: DISCONTINUED | OUTPATIENT
Start: 2018-04-18 | End: 2018-04-22

## 2018-04-18 RX ORDER — ATORVASTATIN CALCIUM 80 MG/1
20 TABLET, FILM COATED ORAL AT BEDTIME
Qty: 0 | Refills: 0 | Status: DISCONTINUED | OUTPATIENT
Start: 2018-04-18 | End: 2018-04-22

## 2018-04-18 RX ORDER — MECLIZINE HCL 12.5 MG
25 TABLET ORAL ONCE
Qty: 0 | Refills: 0 | Status: COMPLETED | OUTPATIENT
Start: 2018-04-18 | End: 2018-04-18

## 2018-04-18 RX ORDER — ONDANSETRON 8 MG/1
4 TABLET, FILM COATED ORAL ONCE
Qty: 0 | Refills: 0 | Status: COMPLETED | OUTPATIENT
Start: 2018-04-18 | End: 2018-04-18

## 2018-04-18 RX ADMIN — Medication 50 MILLIGRAM(S): at 22:43

## 2018-04-18 RX ADMIN — Medication 0.5 MILLIGRAM(S): at 22:43

## 2018-04-18 RX ADMIN — ATORVASTATIN CALCIUM 20 MILLIGRAM(S): 80 TABLET, FILM COATED ORAL at 22:43

## 2018-04-18 RX ADMIN — Medication 25 MILLIGRAM(S): at 14:55

## 2018-04-18 RX ADMIN — LISINOPRIL 40 MILLIGRAM(S): 2.5 TABLET ORAL at 22:43

## 2018-04-18 RX ADMIN — ONDANSETRON 4 MILLIGRAM(S): 8 TABLET, FILM COATED ORAL at 14:55

## 2018-04-18 RX ADMIN — MONTELUKAST 10 MILLIGRAM(S): 4 TABLET, CHEWABLE ORAL at 22:43

## 2018-04-18 RX ADMIN — SODIUM CHLORIDE 500 MILLILITER(S): 9 INJECTION INTRAMUSCULAR; INTRAVENOUS; SUBCUTANEOUS at 13:20

## 2018-04-18 RX ADMIN — Medication 1 TABLET(S): at 22:43

## 2018-04-18 NOTE — H&P ADULT - HISTORY OF PRESENT ILLNESS
82 y/o F with PMHx of HTN, HLD, and Hypothyroidism came in with c/o dizziness. Patient states that she has not been feeling well since she was last discharged from the hospital one week ago. She said she has lightheadedness every time she changes position. She denies vertigo. Denies falls. Denies any recent trauma. She denies chest pain, SOB or syncopal episodes. Denies tinnitus. No other complaints at this time.    SH: Denies smoking, alcohol or illicit drug use. Lives alone at home. Ambulates independently  NKDA

## 2018-04-18 NOTE — ED PROVIDER NOTE - MEDICAL DECISION MAKING DETAILS
82 y/o F pt presents with HA, dizziness, and nausea. No focal neurological deficits. Will check CT Head, labs, EKG, and will re-evaluate.

## 2018-04-18 NOTE — H&P ADULT - NSHPPHYSICALEXAM_GEN_ALL_CORE
Vital Signs Last 24 Hrs  T(C): 37.2 (18 Apr 2018 19:07), Max: 37.2 (18 Apr 2018 19:07)  T(F): 99 (18 Apr 2018 19:07), Max: 99 (18 Apr 2018 19:07)  HR: 52 (18 Apr 2018 15:48) (52 - 57)  BP: 150/70 (18 Apr 2018 15:48) (145/78 - 150/70)  BP(mean): --  RR: 18 (18 Apr 2018 19:07) (18 - 20)  SpO2: 99% (18 Apr 2018 19:07) (96% - 99%)    GENERAL: NAD  HEAD:  Atraumatic, Normocephalic  EYES: EOMI, PERRLA, conjunctiva and sclera clear  ENMT: Moist mucous membranes  NECK: Supple  NERVOUS SYSTEM:  Alert & Oriented X3  CHEST/LUNG: Clear to auscultation bilaterally; No rales, rhonchi, wheezing, or rubs  HEART: Regular rate and rhythm; No murmurs, rubs, or gallops  ABDOMEN: Soft, Nontender, Nondistended; Bowel sounds present  EXTREMITIES:  2+ Peripheral Pulses, No clubbing, cyanosis, or edema  LYMPH: No lymphadenopathy noted  SKIN: No rashes or lesions

## 2018-04-18 NOTE — H&P ADULT - PROBLEM SELECTOR PLAN 1
Denies Denies vertigo  No nystagmus, no focal deficits  CT head negative  Detailed workup done last week including CT head, MRI, Carotid doppler, and ECHO  Orthostatics negative  CE-negative x 1  - PT evaluation

## 2018-04-18 NOTE — ED PROVIDER NOTE - CRANIAL NERVE AND PUPILLARY EXAM
cranial nerves 2-12 intact/central and peripheral vision intact/extra-ocular movements intact/peripheral vision intact/gag reflex intact/tongue is midline/central vision intact/cough reflex intact/corneal reflex intact

## 2018-04-18 NOTE — H&P ADULT - PROBLEM SELECTOR PLAN 5
[] Previous VTE                                                3  [] Thrombophilia                                             2  [] Lower limb paralysis                                   2    [] Current Cancer                                             2   X] Age > 60                                                         1    IMPROVE VTE Score: 2  Lovenox

## 2018-04-18 NOTE — ED PROVIDER NOTE - OBJECTIVE STATEMENT
84 y/o F pt with PMHx of HTN, Hypercholesterolemia, and Hypothyroidism and PSHx of L Knee Surgery presents to ED c/o intermittent dizziness with associated nausea x3 days. Pt additionally reports mild diffuse HA. Pt notes taking Meclizine this morning with no relief of sx's. Pt denies vomiting, CP, weakness, numbness, or any other complaints. NKDA. 84 y/o F pt with PMHx of HTN, Hypercholesterolemia, and Hypothyroidism and PSHx of L Knee Surgery presents to ED c/o intermittent dizziness with associated nausea x3 days. Pt additionally reports mild diffuse HA. Pt notes taking Meclizine this morning with no relief of sx's. Pt denies vomiting, CP, weakness, numbness, or any other complaints. NKDA.    Review of medical record shows that Pt was admitted here about 1 month ago with similar dizziness symptoms and CT head, MRI brain were unremarkable.

## 2018-04-18 NOTE — H&P ADULT - NSHPREVIEWOFSYSTEMS_GEN_ALL_CORE
REVIEW OF SYSTEMS:  CONSTITUTIONAL: No fever, weight loss, or fatigue  ENMT:  No difficulty hearing, tinnitus, vertigo; No sinus or throat pain  NECK: No pain or stiffness  BREASTS: No pain, masses, or nipple discharge  RESPIRATORY: No cough, wheezing, chills or hemoptysis; No shortness of breath  CARDIOVASCULAR: No chest pain, palpitations, dizziness, or leg swelling  GASTROINTESTINAL: No abdominal or epigastric pain. No nausea, vomiting, or hematemesis; No diarrhea or constipation. No melena or hematochezia.  GENITOURINARY: No dysuria, frequency, hematuria, or incontinence  NEUROLOGICAL: lightheaded  ENDOCRINE: No heat or cold intolerance; No hair loss  MUSCULOSKELETAL: No joint pain or swelling; No muscle, back, or extremity pain  PSYCHIATRIC: No depression, anxiety, mood swings, or difficulty sleeping  HEME/LYMPH: No easy bruising, or bleeding gums  ALLERY AND IMMUNOLOGIC: No hives or eczema

## 2018-04-18 NOTE — H&P ADULT - ASSESSMENT
84 y/o F with PMHx of HTN, HLD, and Hypothyroidism came in with c/o dizziness. Patient states that she has not been feeling well since she was last discharged from the hospital one week ago. She said she has lightheadedness every time she changes position. She denies vertigo. Denies falls. Denies any recent trauma. She denies chest pain, SOB or syncopal episodes. Denies tinnitus. No other complaints at this time.

## 2018-04-18 NOTE — ED ADULT NURSE NOTE - OBJECTIVE STATEMENT
Patient came to the ED a/o x 3 ambulates with a cane c/o dizziness noted. Patient has no chest pain/ SOB.

## 2018-04-18 NOTE — H&P ADULT - NSHPLABSRESULTS_GEN_ALL_CORE
13.9   7.8   )-----------( 247      ( 18 Apr 2018 12:54 )             42.8     04-18    140  |  104  |  16  ----------------------------<  89  4.2   |  25  |  0.94    Ca    8.3<L>      18 Apr 2018 12:54    TPro  8.7<H>  /  Alb  3.7  /  TBili  0.5  /  DBili  x   /  AST  18  /  ALT  17  /  AlkPhos  77  04-18    < from: CT Head No Cont (04.18.18 @ 13:55) >    EXAM:  CT BRAIN                            PROCEDURE DATE:  04/18/2018          INTERPRETATION:  CLINICAL STATEMENT: Headache and dizziness    TECHNIQUE: CT of the head was performed without IV contrast.    COMPARISON: CT head 3/10/2018    FINDINGS:  There is mild diffuse parenchymal volume loss. There are areas of low   attenuation in the periventricular subcortical white matter likely   related to severe chronic microvascular ischemic changes. Scattered   calcifications again noted.    There is no acute intracranial hemorrhage, parenchymal mass, mass effect   or midline shift. There is no acute territorial infarct. There is no   hydrocephalus.    The cranium is intact.     The visualized paranasal sinuses are   well-aerated.    IMPRESSION:  No acute intracranial hemorrhage or acute territorial infarct.      < end of copied text >

## 2018-04-19 LAB
ANION GAP SERPL CALC-SCNC: 8 MMOL/L — SIGNIFICANT CHANGE UP (ref 5–17)
BASOPHILS # BLD AUTO: 0.1 K/UL — SIGNIFICANT CHANGE UP (ref 0–0.2)
BASOPHILS NFR BLD AUTO: 1.4 % — SIGNIFICANT CHANGE UP (ref 0–2)
BUN SERPL-MCNC: 20 MG/DL — HIGH (ref 7–18)
CALCIUM SERPL-MCNC: 7.5 MG/DL — LOW (ref 8.4–10.5)
CHLORIDE SERPL-SCNC: 106 MMOL/L — SIGNIFICANT CHANGE UP (ref 96–108)
CO2 SERPL-SCNC: 28 MMOL/L — SIGNIFICANT CHANGE UP (ref 22–31)
CREAT SERPL-MCNC: 0.85 MG/DL — SIGNIFICANT CHANGE UP (ref 0.5–1.3)
EOSINOPHIL # BLD AUTO: 0.2 K/UL — SIGNIFICANT CHANGE UP (ref 0–0.5)
EOSINOPHIL NFR BLD AUTO: 2.6 % — SIGNIFICANT CHANGE UP (ref 0–6)
GLUCOSE SERPL-MCNC: 81 MG/DL — SIGNIFICANT CHANGE UP (ref 70–99)
HCT VFR BLD CALC: 35 % — SIGNIFICANT CHANGE UP (ref 34.5–45)
HGB BLD-MCNC: 12.3 G/DL — SIGNIFICANT CHANGE UP (ref 11.5–15.5)
LYMPHOCYTES # BLD AUTO: 1.8 K/UL — SIGNIFICANT CHANGE UP (ref 1–3.3)
LYMPHOCYTES # BLD AUTO: 28.8 % — SIGNIFICANT CHANGE UP (ref 13–44)
MCHC RBC-ENTMCNC: 32.2 PG — SIGNIFICANT CHANGE UP (ref 27–34)
MCHC RBC-ENTMCNC: 35.2 GM/DL — SIGNIFICANT CHANGE UP (ref 32–36)
MCV RBC AUTO: 91.4 FL — SIGNIFICANT CHANGE UP (ref 80–100)
MONOCYTES # BLD AUTO: 0.6 K/UL — SIGNIFICANT CHANGE UP (ref 0–0.9)
MONOCYTES NFR BLD AUTO: 9.3 % — SIGNIFICANT CHANGE UP (ref 2–14)
NEUTROPHILS # BLD AUTO: 3.7 K/UL — SIGNIFICANT CHANGE UP (ref 1.8–7.4)
NEUTROPHILS NFR BLD AUTO: 58 % — SIGNIFICANT CHANGE UP (ref 43–77)
PLATELET # BLD AUTO: 188 K/UL — SIGNIFICANT CHANGE UP (ref 150–400)
POTASSIUM SERPL-MCNC: 3.9 MMOL/L — SIGNIFICANT CHANGE UP (ref 3.5–5.3)
POTASSIUM SERPL-SCNC: 3.9 MMOL/L — SIGNIFICANT CHANGE UP (ref 3.5–5.3)
RBC # BLD: 3.83 M/UL — SIGNIFICANT CHANGE UP (ref 3.8–5.2)
RBC # FLD: 13.3 % — SIGNIFICANT CHANGE UP (ref 10.3–14.5)
SODIUM SERPL-SCNC: 142 MMOL/L — SIGNIFICANT CHANGE UP (ref 135–145)
WBC # BLD: 6.4 K/UL — SIGNIFICANT CHANGE UP (ref 3.8–10.5)
WBC # FLD AUTO: 6.4 K/UL — SIGNIFICANT CHANGE UP (ref 3.8–10.5)

## 2018-04-19 RX ORDER — LISINOPRIL 2.5 MG/1
20 TABLET ORAL DAILY
Qty: 0 | Refills: 0 | Status: DISCONTINUED | OUTPATIENT
Start: 2018-04-19 | End: 2018-04-22

## 2018-04-19 RX ORDER — SODIUM CHLORIDE 9 MG/ML
1000 INJECTION INTRAMUSCULAR; INTRAVENOUS; SUBCUTANEOUS
Qty: 0 | Refills: 0 | Status: DISCONTINUED | OUTPATIENT
Start: 2018-04-19 | End: 2018-04-22

## 2018-04-19 RX ADMIN — LISINOPRIL 40 MILLIGRAM(S): 2.5 TABLET ORAL at 07:14

## 2018-04-19 RX ADMIN — MONTELUKAST 10 MILLIGRAM(S): 4 TABLET, CHEWABLE ORAL at 12:52

## 2018-04-19 RX ADMIN — Medication 0.5 MILLIGRAM(S): at 17:39

## 2018-04-19 RX ADMIN — Medication 81 MILLIGRAM(S): at 11:53

## 2018-04-19 RX ADMIN — Medication 0.5 MILLIGRAM(S): at 07:13

## 2018-04-19 RX ADMIN — SODIUM CHLORIDE 75 MILLILITER(S): 9 INJECTION INTRAMUSCULAR; INTRAVENOUS; SUBCUTANEOUS at 13:25

## 2018-04-19 RX ADMIN — ATORVASTATIN CALCIUM 20 MILLIGRAM(S): 80 TABLET, FILM COATED ORAL at 22:12

## 2018-04-19 RX ADMIN — Medication 100 MICROGRAM(S): at 07:14

## 2018-04-19 RX ADMIN — Medication 1 TABLET(S): at 13:24

## 2018-04-19 RX ADMIN — Medication 50 MILLIGRAM(S): at 07:14

## 2018-04-19 NOTE — PHYSICAL THERAPY INITIAL EVALUATION ADULT - DIAGNOSIS, PT EVAL
Slight decline in functional mobility due to dizziness and lightheadedness with increasing activities, decreased balance and endurance.

## 2018-04-19 NOTE — PHYSICAL THERAPY INITIAL EVALUATION ADULT - CRITERIA FOR SKILLED THERAPEUTIC INTERVENTIONS
predicted duration of therapy intervention/anticipated equipment needs at discharge/functional limitations in following categories/risk reduction/prevention/rehab potential/anticipated discharge recommendation/therapy frequency/impairments found

## 2018-04-19 NOTE — CONSULT NOTE ADULT - SUBJECTIVE AND OBJECTIVE BOX
HISTORY OF PRESENT ILLNESS: HPI:  84 y/o F with PMHx of HTN, HLD, and Hypothyroidism came in with c/o dizziness. Patient states that she has not been feeling well since she was last discharged from the hospital one week ago. She said she has lightheadedness every time she changes position. She denies vertigo. Denies falls. Denies any recent trauma. She denies chest pain, SOB or syncopal episodes. Denies tinnitus. No other complaints at this time.  She has not lost consciousness    SH: Denies smoking, alcohol or illicit drug use. Lives alone at home. Ambulates independently  NKDA (18 Apr 2018 20:16)      PAST MEDICAL & SURGICAL HISTORY:  Hypothyroid  Hypercholesterolemia  History of hypertension  H/O left knee surgery: repair only          MEDICATIONS:  MEDICATIONS  (STANDING):  ALPRAZolam 0.5 milliGRAM(s) Oral two times a day  aspirin  chewable 81 milliGRAM(s) Oral daily  atorvastatin 20 milliGRAM(s) Oral at bedtime  calcium carbonate  625 mG + Vitamin D (OsCal 250 + D) 1 Tablet(s) Oral daily  levothyroxine 100 MICROGram(s) Oral daily  lisinopril 40 milliGRAM(s) Oral daily  metoprolol succinate ER 50 milliGRAM(s) Oral daily  montelukast 10 milliGRAM(s) Oral daily  sodium chloride 0.9%. 1000 milliLiter(s) (75 mL/Hr) IV Continuous <Continuous>      Allergies    No Known Allergies    Intolerances        FAMILY HISTORY:  Family history of stroke (cerebrovascular)    Non-contributary for premature coronary disease or sudden cardiac death    SOCIAL HISTORY:    [X ] Non-smoker  [ ] Smoker  [ ] Alcohol      REVIEW OF SYSTEMS:  [ ]chest pain  [  ]shortness of breath  [  ]palpitations  [  ]syncope  [ ]near syncope [ ]upper extremity weakness   [ ] lower extremity weakness  [  ]diplopia  [  ]altered mental status   [  ]fevers  [ ]chills [ ]nausea  [ ]vomitting  [  ]dysphagia    [ ]abdominal pain  [ ]melena  [ ]BRBPR    [  ]epistaxis  [  ]rash    [ ]lower extremity edema        [X ] All others negative	  [ ] Unable to obtain    PHYSICAL EXAM:  T(C): 37.1 (04-19-18 @ 14:13), Max: 37.2 (04-18-18 @ 19:07)  HR: 50 (04-19-18 @ 14:13) (50 - 60)  BP: 126/69 (04-19-18 @ 14:13) (116/58 - 150/70)  RR: 16 (04-19-18 @ 14:13) (15 - 18)  SpO2: 100% (04-19-18 @ 14:13) (92% - 100%)  Wt(kg): --  I&O's Summary        HEENT:   Normal oral mucosa, PERRL, EOMI	  Lymphatic: No obvious lymphadenopathy , no edema  Cardiovascular: Normal S1 S2, No JVD,  2/6 KIRA murmur , Peripheral pulses palpable 2+ bilaterally  Respiratory: Lungs clear to auscultation, normal effort 	  Gastrointestinal:  Soft, Non-tender, + BS	  Skin: No rashes, No cyanosis, warm to touch  Musculoskeletal: Normal range of motion, normal strength  Psychiatry:  Appropriate Mood & affect       ECG:  sinus sharon 50 BPM, RBBB LVH	        	  	  LABS:	 	    CARDIAC MARKERS:  CARDIAC MARKERS ( 18 Apr 2018 12:54 )  <0.015 ng/mL / x     / x     / x     / x                                  12.3   6.4   )-----------( 188      ( 19 Apr 2018 07:00 )             35.0     Hb Trend: 12.3<--    04-19    142  |  106  |  20<H>  ----------------------------<  81  3.9   |  28  |  0.85    Ca    7.5<L>      19 Apr 2018 07:00    TPro  8.7<H>  /  Alb  3.7  /  TBili  0.5  /  DBili  x   /  AST  18  /  ALT  17  /  AlkPhos  77  04-18    Creatinine Trend: 0.85<--, 0.94<--      ASSESSMENT/PLAN: 	83y Female   HTN, HLD, and Hypothyroidism came in with c/o dizziness hyperdynamic LV function and moderate AS on recent echo found to be orthostatic.    - Agree with fluid challenge  - May need lower extremity stockings  - decrease lisinopril to 20 mg PO qd    Erich Ramirez MD, FACC  [facility]  [address] [place] [address]  PHONE:  [phone]  BEEPER : [phone]

## 2018-04-19 NOTE — PHYSICAL THERAPY INITIAL EVALUATION ADULT - PERTINENT HX OF CURRENT PROBLEM, REHAB EVAL
Pt. admitted for dizziness. Patient states that she has not been feeling well since she was last discharged from the hospital one week ago. She said she has lightheadedness every time she changes position.

## 2018-04-19 NOTE — PROGRESS NOTE ADULT - PROBLEM SELECTOR PLAN 1
Pt denied vertigo  No nystagmus, no focal deficits  CT head negative  Orthostatics positive  Pt started on IVF  Dr. Ramirez, cardio, consulted and noted hyperdynamic LV function and  moderate AS found on recent echo   Lisinopril decreased to 20 mg., PO daily

## 2018-04-20 DIAGNOSIS — R42 DIZZINESS AND GIDDINESS: ICD-10-CM

## 2018-04-20 LAB
ANION GAP SERPL CALC-SCNC: 10 MMOL/L — SIGNIFICANT CHANGE UP (ref 5–17)
BUN SERPL-MCNC: 21 MG/DL — HIGH (ref 7–18)
CALCIUM SERPL-MCNC: 7.1 MG/DL — LOW (ref 8.4–10.5)
CHLORIDE SERPL-SCNC: 106 MMOL/L — SIGNIFICANT CHANGE UP (ref 96–108)
CO2 SERPL-SCNC: 27 MMOL/L — SIGNIFICANT CHANGE UP (ref 22–31)
CREAT SERPL-MCNC: 0.83 MG/DL — SIGNIFICANT CHANGE UP (ref 0.5–1.3)
GLUCOSE SERPL-MCNC: 85 MG/DL — SIGNIFICANT CHANGE UP (ref 70–99)
HCT VFR BLD CALC: 36.4 % — SIGNIFICANT CHANGE UP (ref 34.5–45)
HGB BLD-MCNC: 11.9 G/DL — SIGNIFICANT CHANGE UP (ref 11.5–15.5)
MCHC RBC-ENTMCNC: 30 PG — SIGNIFICANT CHANGE UP (ref 27–34)
MCHC RBC-ENTMCNC: 32.7 GM/DL — SIGNIFICANT CHANGE UP (ref 32–36)
MCV RBC AUTO: 91.7 FL — SIGNIFICANT CHANGE UP (ref 80–100)
PLATELET # BLD AUTO: 175 K/UL — SIGNIFICANT CHANGE UP (ref 150–400)
POTASSIUM SERPL-MCNC: 3.6 MMOL/L — SIGNIFICANT CHANGE UP (ref 3.5–5.3)
POTASSIUM SERPL-SCNC: 3.6 MMOL/L — SIGNIFICANT CHANGE UP (ref 3.5–5.3)
RBC # BLD: 3.96 M/UL — SIGNIFICANT CHANGE UP (ref 3.8–5.2)
RBC # FLD: 13.2 % — SIGNIFICANT CHANGE UP (ref 10.3–14.5)
SODIUM SERPL-SCNC: 143 MMOL/L — SIGNIFICANT CHANGE UP (ref 135–145)
WBC # BLD: 6.6 K/UL — SIGNIFICANT CHANGE UP (ref 3.8–10.5)
WBC # FLD AUTO: 6.6 K/UL — SIGNIFICANT CHANGE UP (ref 3.8–10.5)

## 2018-04-20 RX ORDER — ATORVASTATIN CALCIUM 80 MG/1
1 TABLET, FILM COATED ORAL
Qty: 0 | Refills: 0 | COMMUNITY
Start: 2018-04-20

## 2018-04-20 RX ORDER — ZALEPLON 10 MG
5 CAPSULE ORAL ONCE
Qty: 0 | Refills: 0 | Status: DISCONTINUED | OUTPATIENT
Start: 2018-04-20 | End: 2018-04-20

## 2018-04-20 RX ORDER — ENOXAPARIN SODIUM 100 MG/ML
40 INJECTION SUBCUTANEOUS DAILY
Qty: 0 | Refills: 0 | Status: DISCONTINUED | OUTPATIENT
Start: 2018-04-20 | End: 2018-04-22

## 2018-04-20 RX ORDER — ATORVASTATIN CALCIUM 80 MG/1
1 TABLET, FILM COATED ORAL
Qty: 0 | Refills: 0 | COMMUNITY

## 2018-04-20 RX ADMIN — LISINOPRIL 20 MILLIGRAM(S): 2.5 TABLET ORAL at 06:07

## 2018-04-20 RX ADMIN — ENOXAPARIN SODIUM 40 MILLIGRAM(S): 100 INJECTION SUBCUTANEOUS at 14:29

## 2018-04-20 RX ADMIN — Medication 1 TABLET(S): at 12:15

## 2018-04-20 RX ADMIN — MONTELUKAST 10 MILLIGRAM(S): 4 TABLET, CHEWABLE ORAL at 12:15

## 2018-04-20 RX ADMIN — Medication 100 MICROGRAM(S): at 06:07

## 2018-04-20 RX ADMIN — Medication 81 MILLIGRAM(S): at 12:15

## 2018-04-20 RX ADMIN — Medication 0.5 MILLIGRAM(S): at 17:02

## 2018-04-20 RX ADMIN — ATORVASTATIN CALCIUM 20 MILLIGRAM(S): 80 TABLET, FILM COATED ORAL at 22:09

## 2018-04-20 RX ADMIN — Medication 0.5 MILLIGRAM(S): at 06:07

## 2018-04-20 RX ADMIN — Medication 5 MILLIGRAM(S): at 23:17

## 2018-04-20 NOTE — PROGRESS NOTE ADULT - ASSESSMENT
82yo female w/ orthostatic hypotension, improving
84 y/o F with PMHx of HTN, HLD, and Hypothyroidism came in with c/o dizziness. Patient states that she has not been feeling well since she was last discharged from the hospital one week ago. She said she has lightheadedness every time she changes position. She denies vertigo. Denies falls. Denies any recent trauma. She denies chest pain, SOB or syncopal episodes. Denies tinnitus. No other complaints at this time.

## 2018-04-20 NOTE — DISCHARGE NOTE ADULT - PLAN OF CARE
Resolution When getting up from lying position sit on the bed and dangle your feet.  After dangling your feet slowly stand and steady yourself before walking.  Your Lisinopril was decreased b/c it was thought to cause your dizziness Maintain normal blood pressure Your Lisinopril was decreased b/c it was thought to cause your dizziness.  Continue taking your blood pressure medicine daily Take your Synthroid daily Take your Alprazolam as instructed When getting up from lying position sit on the bed and dangle your feet.  After dangling your feet slowly stand and steady yourself before walking.  Your Lisinopril was decreased b/c it was thought to cause your dizziness  Follow up w/ PCP in 2-3 days

## 2018-04-20 NOTE — DISCHARGE NOTE ADULT - CARE PLAN
Principal Discharge DX:	Dizziness  Goal:	Resolution  Assessment and plan of treatment:	When getting up from lying position sit on the bed and dangle your feet.  After dangling your feet slowly stand and steady yourself before walking.  Your Lisinopril was decreased b/c it was thought to cause your dizziness  Secondary Diagnosis:	History of hypertension  Goal:	Maintain normal blood pressure  Assessment and plan of treatment:	Your Lisinopril was decreased b/c it was thought to cause your dizziness.  Continue taking your blood pressure medicine daily  Secondary Diagnosis:	Hypothyroid  Assessment and plan of treatment:	Take your Synthroid daily  Secondary Diagnosis:	Anxiety  Assessment and plan of treatment:	Take your Alprazolam as instructed Principal Discharge DX:	Dizziness  Goal:	Resolution  Assessment and plan of treatment:	When getting up from lying position sit on the bed and dangle your feet.  After dangling your feet slowly stand and steady yourself before walking.  Your Lisinopril was decreased b/c it was thought to cause your dizziness  Follow up w/ PCP in 2-3 days  Secondary Diagnosis:	History of hypertension  Goal:	Maintain normal blood pressure  Assessment and plan of treatment:	Your Lisinopril was decreased b/c it was thought to cause your dizziness.  Continue taking your blood pressure medicine daily  Secondary Diagnosis:	Hypothyroid  Assessment and plan of treatment:	Take your Synthroid daily  Secondary Diagnosis:	Anxiety  Assessment and plan of treatment:	Take your Alprazolam as instructed

## 2018-04-20 NOTE — DISCHARGE NOTE ADULT - MEDICATION SUMMARY - MEDICATIONS TO TAKE
I will START or STAY ON the medications listed below when I get home from the hospital:    aspirin buffered 325 mg oral tablet  -- 1 tab(s) by mouth once a day  -- Indication: For cardioprotective    quinapril 40 mg oral tablet  -- 1 tab(s) by mouth once a day  -- Indication: For History of hypertension    atorvastatin 20 mg oral tablet  -- 1 tab(s) by mouth once a day (at bedtime)  -- Indication: For Hyperlipidemia    Xanax  -- 0.5 milligram(s) by mouth 2 times a day  -- Indication: For Anxiety    Toprol-XL 50 mg oral tablet, extended release  -- 1 tab(s) by mouth once a day  -- Indication: For History of hypertension    montelukast 10 mg oral tablet  -- 1 tab(s) by mouth once a day  -- Indication: For respiratory comfort    Synthroid  -- 100 microgram(s) by mouth   -- Indication: For Hypothyroid    Calcium 500+D oral tablet, chewable  -- 1 tab(s) by mouth 2 times a day  -- Indication: For calcicum supplement I will START or STAY ON the medications listed below when I get home from the hospital:    aspirin buffered 325 mg oral tablet  -- 1 tab(s) by mouth once a day  -- Indication: For cardioprotective    atorvastatin 20 mg oral tablet  -- 1 tab(s) by mouth once a day (at bedtime)  -- Indication: For Hyperlipidemia    Xanax  -- 0.5 milligram(s) by mouth 2 times a day  -- Indication: For Anxiety    Toprol-XL 50 mg oral tablet, extended release  -- 1 tab(s) by mouth once a day  -- Indication: For History of hypertension    montelukast 10 mg oral tablet  -- 1 tab(s) by mouth once a day  -- Indication: For respiratory comfort    Synthroid  -- 100 microgram(s) by mouth   -- Indication: For Hypothyroid    Calcium 500+D oral tablet, chewable  -- 1 tab(s) by mouth 2 times a day  -- Indication: For calcicum supplement

## 2018-04-20 NOTE — DISCHARGE NOTE ADULT - CARE PROVIDER_API CALL
Lauren Naranjo (JAKE), Internal Medicine  [address] [place] [address]  Phone: [phone]  Fax: [phone]

## 2018-04-20 NOTE — DISCHARGE NOTE ADULT - PATIENT PORTAL LINK FT
You can access the [facility], offered by [facility], by registering with the following website: [email or web address]

## 2018-04-20 NOTE — PROGRESS NOTE ADULT - PROBLEM SELECTOR PLAN 1
D/t orthostatic hypotension  Decreased Lisinopril to 20mg daily  **Per Cardiologist if orthostatic d/c Lisinopril upon DC D/t orthostatic hypotension  Decreased Lisinopril to 20mg daily and orthostatic improved today  **Per Cardiologist if orthostatic d/c Lisinopril upon DC

## 2018-04-20 NOTE — PROGRESS NOTE ADULT - PROBLEM SELECTOR PLAN 4
C/w Levothyroxine
Continued with home antihypertensive meds  BP monitored (orthostatics positive today and Lisinopril decreased)  DASH diet

## 2018-04-20 NOTE — DISCHARGE NOTE ADULT - MEDICATION SUMMARY - MEDICATIONS TO STOP TAKING
I will STOP taking the medications listed below when I get home from the hospital:    Ceftin 250 mg oral tablet  -- 1 tab(s) by mouth 2 times a day   -- Finish all this medication unless otherwise directed by prescriber.  Medication should be taken with plenty of water.  Take with food or milk.

## 2018-04-20 NOTE — DISCHARGE NOTE ADULT - HOSPITAL COURSE
82yo female with PMH of HTN, HLD, and Hypothyroidism.  Presented to the ED with dizziness.     Admitted to medicine for Dizziness    Lightheaded  Orthostatics positive and source of dizziness  CT head negative  Detailed workup done last week including CT head, MRI, Carotid doppler, and ECHO  Received IV hydration  Decreased Lisinopril to 20mg daily  **Per Cardiologist if orthostatic d/c Lisinopril upon DC      History of Hypertension   Lisinopril was decreased as thought to contribute to (+) orthostatics  Continued Metoprolol & hospital equivalent of Quinapril-Lisinopril  Cardiologist-Dr. Ramirez consulted     Hypothyroid  Continued Levothyroxine     Anxiety  Continued Alprazolam     Need for Prophylactic Measure  Lovenox for VTE prophylaxis

## 2018-04-21 RX ORDER — QUINAPRIL HYDROCHLORIDE 40 MG/1
1 TABLET, FILM COATED ORAL
Qty: 0 | Refills: 0 | COMMUNITY

## 2018-04-21 RX ORDER — ALPRAZOLAM 0.25 MG
0.5 TABLET ORAL ONCE
Qty: 0 | Refills: 0 | Status: DISCONTINUED | OUTPATIENT
Start: 2018-04-21 | End: 2018-04-21

## 2018-04-21 RX ADMIN — Medication 81 MILLIGRAM(S): at 12:22

## 2018-04-21 RX ADMIN — Medication 0.5 MILLIGRAM(S): at 12:30

## 2018-04-21 RX ADMIN — ENOXAPARIN SODIUM 40 MILLIGRAM(S): 100 INJECTION SUBCUTANEOUS at 12:22

## 2018-04-21 RX ADMIN — ATORVASTATIN CALCIUM 20 MILLIGRAM(S): 80 TABLET, FILM COATED ORAL at 21:03

## 2018-04-21 RX ADMIN — Medication 1 TABLET(S): at 12:22

## 2018-04-21 RX ADMIN — Medication 0.5 MILLIGRAM(S): at 19:06

## 2018-04-21 RX ADMIN — Medication 100 MICROGRAM(S): at 06:17

## 2018-04-21 RX ADMIN — Medication 50 MILLIGRAM(S): at 06:59

## 2018-04-21 RX ADMIN — MONTELUKAST 10 MILLIGRAM(S): 4 TABLET, CHEWABLE ORAL at 12:23

## 2018-04-21 RX ADMIN — LISINOPRIL 20 MILLIGRAM(S): 2.5 TABLET ORAL at 06:17

## 2018-04-21 RX ADMIN — Medication 0.5 MILLIGRAM(S): at 06:17

## 2018-04-21 NOTE — PROGRESS NOTE ADULT - ATTENDING COMMENTS
Agree with above.   -Monitor orthostatics; check today  -teds stockings for orthostatic hypotension    Zander Mota MD  [facility]  [address], [address]  [place] [address]  office: [phone]  pager: [phone]

## 2018-04-21 NOTE — PROGRESS NOTE ADULT - SUBJECTIVE AND OBJECTIVE BOX
Had some dizziness with postural changes today  Review of systems otherwise (-)  	  MEDICATIONS:  MEDICATIONS  (STANDING):  ALPRAZolam 0.5 milliGRAM(s) Oral two times a day  aspirin  chewable 81 milliGRAM(s) Oral daily  atorvastatin 20 milliGRAM(s) Oral at bedtime  calcium carbonate  625 mG + Vitamin D (OsCal 250 + D) 1 Tablet(s) Oral daily  enoxaparin Injectable 40 milliGRAM(s) SubCutaneous daily  levothyroxine 100 MICROGram(s) Oral daily  lisinopril 20 milliGRAM(s) Oral daily  metoprolol succinate ER 50 milliGRAM(s) Oral daily  montelukast 10 milliGRAM(s) Oral daily  sodium chloride 0.9%. 1000 milliLiter(s) (75 mL/Hr) IV Continuous <Continuous>      LABS:	 	    CARDIAC MARKERS:  CARDIAC MARKERS ( 18 Apr 2018 12:54 )  <0.015 ng/mL / x     / x     / x     / x                                    11.9   6.6   )-----------( 175      ( 20 Apr 2018 06:21 )             36.4     Hemoglobin: 11.9 g/dL (04-20 @ 06:21)  Hemoglobin: 12.3 g/dL (04-19 @ 07:00)  Hemoglobin: 13.9 g/dL (04-18 @ 12:54)      04-20    143  |  106  |  21<H>  ----------------------------<  85  3.6   |  27  |  0.83    Ca    7.1<L>      20 Apr 2018 06:21      Creatinine Trend: 0.83<--, 0.85<--, 0.94<--        PHYSICAL EXAM:  T(C): 36.6 (04-20-18 @ 14:02), Max: 36.8 (04-19-18 @ 20:19)  HR: 59 (04-20-18 @ 14:02) (54 - 59)  BP: 135/65 (04-20-18 @ 14:02) (132/73 - 144/64)  RR: 16 (04-20-18 @ 14:02) (14 - 16)  SpO2: 99% (04-20-18 @ 14:02) (95% - 99%)  Wt(kg): --  I&O's Summary        HEENT:   Normal oral mucosa, PERRL, EOMI	  Lymphatic: No obvious lymphadenopathy , no edema  Cardiovascular: Normal S1 S2, No JVD, 1/6 KIRA murmur, Peripheral pulses palpable 2+ bilaterally  Respiratory: Lungs clear to auscultation, normal effort 	  Gastrointestinal:  Soft, Non-tender, + BS	  Skin: No rashes,  No cyanosis, warm to touch  Musculoskeletal: Normal range of motion, normal strength  Psychiatry:  Appropriate Mood & affect         ASSESSMENT/PLAN: 	83y Female HTN, HLD, and Hypothyroidism came in with c/o dizziness hyperdynamic LV function and moderate AS on recent echo found to be orthostatic.    - cont IVF  - If remains orthostatic would d/c lisinopril  - Consider faby Ramirez MD, [facility], [facility]  [address] [place] [address]  PHONE:  [phone]  BEEPER : [phone]
NP Note discussed with  primary attending    83y old Female who presented with dizziness.  LOS # 2.  States being "Ok."  Denies HA, dizziness.  However, stated that she's only dizzy when she gets up, lying she's fine. LBM today.      INTERVAL HPI/OVERNIGHT EVENTS: no new complaints    MEDICATIONS  (STANDING):  ALPRAZolam 0.5 milliGRAM(s) Oral two times a day  aspirin  chewable 81 milliGRAM(s) Oral daily  atorvastatin 20 milliGRAM(s) Oral at bedtime  calcium carbonate  625 mG + Vitamin D (OsCal 250 + D) 1 Tablet(s) Oral daily  enoxaparin Injectable 40 milliGRAM(s) SubCutaneous daily  levothyroxine 100 MICROGram(s) Oral daily  lisinopril 20 milliGRAM(s) Oral daily  metoprolol succinate ER 50 milliGRAM(s) Oral daily  montelukast 10 milliGRAM(s) Oral daily  sodium chloride 0.9%. 1000 milliLiter(s) (75 mL/Hr) IV Continuous <Continuous>    MEDICATIONS  (PRN):      __________________________________________________  REVIEW OF SYSTEMS:    CONSTITUTIONAL: No fever  EYES: No acute visual disturbances  NECK: No pain or stiffness  RESPIRATORY: No cough; No shortness of breath  CARDIOVASCULAR: No chest pain, no palpitations  GASTROINTESTINAL: No pain. No nausea or vomiting.  No diarrhea   NEUROLOGICAL: No headache or numbness, no tremors  MUSCULOSKELETAL: No joint pain, no muscle pain  GENITOURINARY: No dysuria, no frequency, no hesitancy  PSYCHIATRY: No depression.  (+) H/o anxiety  ALL OTHER  ROS negative        Vital Signs Last 24 Hrs  T(C): 36.6 (20 Apr 2018 14:02), Max: 36.8 (19 Apr 2018 20:19)  T(F): 97.8 (20 Apr 2018 14:02), Max: 98.3 (19 Apr 2018 20:19)  HR: 59 (20 Apr 2018 14:02) (54 - 59)  BP: 135/65 (20 Apr 2018 14:02) (132/73 - 144/64)  BP(mean): --  RR: 16 (20 Apr 2018 14:02) (14 - 16)  SpO2: 99% (20 Apr 2018 14:02) (95% - 99%)    ________________________________________________  PHYSICAL EXAM:  GENERAL: NAD  HEENT: Normocephalic;  conjunctivae and sclerae clear; moist mucous membranes;   NECK : Supple  CHEST/LUNG: Clear to auscultation bilaterally with good air entry   HEART: S1 S2  regular; no murmurs, gallops or rubs  ABDOMEN: Soft, Nontender, Nondistended; Bowel sounds present x 4 quad  EXTREMITIES: No cyanosis; no edema; no calf tenderness  SKIN: Warm and dry; no rash  NERVOUS SYSTEM:  Awake and alert; Oriented  to place, person and time ; no new deficits    _________________________________________________  LABS:                        11.9   6.6   )-----------( 175      ( 20 Apr 2018 06:21 )             36.4     04-20    143  |  106  |  21<H>  ----------------------------<  85  3.6   |  27  |  0.83    Ca    7.1<L>      20 Apr 2018 06:21          Consultant(s) Notes Reviewed:   YES    Care Discussed with Consultants :     Plan of care was discussed with patient and /or primary care giver; all questions and concerns were addressed and care was aligned with patient's wishes.
pt seen and examined, no complaints on exam.   resting in bed, no acute issues overnight       ALPRAZolam 0.5 milliGRAM(s) Oral two times a day  aspirin  chewable 81 milliGRAM(s) Oral daily  atorvastatin 20 milliGRAM(s) Oral at bedtime  calcium carbonate  625 mG + Vitamin D (OsCal 250 + D) 1 Tablet(s) Oral daily  enoxaparin Injectable 40 milliGRAM(s) SubCutaneous daily  levothyroxine 100 MICROGram(s) Oral daily  lisinopril 20 milliGRAM(s) Oral daily  metoprolol succinate ER 50 milliGRAM(s) Oral daily  montelukast 10 milliGRAM(s) Oral daily  sodium chloride 0.9%. 1000 milliLiter(s) IV Continuous <Continuous>                            11.9   6.6   )-----------( 175      ( 20 Apr 2018 06:21 )             36.4       Hemoglobin: 11.9 g/dL (04-20 @ 06:21)  Hemoglobin: 12.3 g/dL (04-19 @ 07:00)  Hemoglobin: 13.9 g/dL (04-18 @ 12:54)      04-20    143  |  106  |  21<H>  ----------------------------<  85  3.6   |  27  |  0.83    Ca    7.1<L>      20 Apr 2018 06:21      Creatinine Trend: 0.83<--, 0.85<--, 0.94<--    COAGS:     CARDIAC MARKERS ( 18 Apr 2018 12:54 )  <0.015 ng/mL / x     / x     / x     / x            T(C): 36.9 (04-21-18 @ 05:52), Max: 36.9 (04-20-18 @ 20:34)  HR: 66 (04-21-18 @ 05:52) (55 - 66)  BP: 160/85 (04-21-18 @ 05:52) (132/73 - 160/85)  RR: 15 (04-21-18 @ 05:52) (15 - 18)  SpO2: 93% (04-21-18 @ 05:52) (93% - 99%)  Wt(kg): --    I&O's Summary      HEENT:   Normal oral mucosa, PERRL, EOMI	  Lymphatic: No obvious lymphadenopathy , no edema  Cardiovascular: Normal S1 S2, No JVD, 1/6 KIRA murmur, Peripheral pulses palpable 2+ bilaterally  Respiratory: Lungs clear to auscultation, normal effort 	  Gastrointestinal:  Soft, Non-tender, + BS	  Skin: No rashes,  No cyanosis, warm to touch  Musculoskeletal: Normal range of motion, normal strength  Psychiatry:  Appropriate Mood & affect         ASSESSMENT/PLAN: 	83y Female HTN, HLD, and Hypothyroidism came in with c/o dizziness hyperdynamic LV function and moderate AS on recent echo found to be orthostatic.     GI / DVT prophylaxis.   keep K>4, mag >2.0    cont IVF  - If remains orthostatic would d/c lisinopril  cont BB , statin   no s/s of chf on exam  D/W Dr Mota
· Subjective and Objective: 	  no acute events,c/o mild dizziness        MEDICATIONS  (STANDING):  ALPRAZolam 0.5 milliGRAM(s) Oral two times a day  aspirin  chewable 81 milliGRAM(s) Oral daily  atorvastatin 20 milliGRAM(s) Oral at bedtime  calcium carbonate  625 mG + Vitamin D (OsCal 250 + D) 1 Tablet(s) Oral daily  enoxaparin Injectable 40 milliGRAM(s) SubCutaneous daily  levothyroxine 100 MICROGram(s) Oral daily  lisinopril 20 milliGRAM(s) Oral daily  metoprolol succinate ER 50 milliGRAM(s) Oral daily  montelukast 10 milliGRAM(s) Oral daily  sodium chloride 0.9%. 1000 milliLiter(s) (75 mL/Hr) IV Continuous <Continuous>    MEDICATIONS  (PRN):      Vital Signs Last 24 Hrs stable    ________________________________________________  PHYSICAL EXAM:  GENERAL: NAD  HEENT: Normocephalic;  conjunctivae and sclerae clear; moist mucous membranes;   NECK : Supple  CHEST/LUNG: Clear to auscultation bilaterally with good air entry   HEART: S1 S2  regular; no murmurs, gallops or rubs  ABDOMEN: Soft, Nontender, Nondistended; Bowel sounds present x 4 quad  EXTREMITIES: No cyanosis; no edema; no calf tenderness  SKIN: Warm and dry; no rash  NERVOUS SYSTEM:  Awake and alert; Oriented  to place, person and time ; no new deficits    _________________________________________________  Assessment and Plan:   · Assessment		  84yo female w/ orthostatic hypotension, improving     Problem/Plan - 1:  ·  Problem: Dizziness.  Plan: D/t orthostatic hypotension  Decreased Lisinopril to 10mg daily and orthostatic improved today  **Per Cardiologist if orthostatic d/c Lisinopril upon DC.      Problem/Plan - 2:  ·  Problem: History of hypertension.  Plan: Lisinopril was decreased as thought to contribute to (+) orthostatics.      Problem/Plan - 3:  ·  Problem: Anxiety.  Plan: C/w Alprazolam.      Problem/Plan - 4:  ·  Problem: Hypothyroid.  Plan: C/w Levothyroxine.      Problem/Plan - 5:  ·  Problem: Need for prophylactic measure.  Plan: Lovenox for VTE prophylaxis.   d/c planning
NP Note discussed with  Primary Attending    Patient is a 83y old  Female who presents with a chief complaint of dizziness (2018 20:16)      INTERVAL HPI/OVERNIGHT EVENTS: no new complaints    MEDICATIONS  (STANDING):  ALPRAZolam 0.5 milliGRAM(s) Oral two times a day  aspirin  chewable 81 milliGRAM(s) Oral daily  atorvastatin 20 milliGRAM(s) Oral at bedtime  calcium carbonate  625 mG + Vitamin D (OsCal 250 + D) 1 Tablet(s) Oral daily  levothyroxine 100 MICROGram(s) Oral daily  lisinopril 20 milliGRAM(s) Oral daily  metoprolol succinate ER 50 milliGRAM(s) Oral daily  montelukast 10 milliGRAM(s) Oral daily  sodium chloride 0.9%. 1000 milliLiter(s) (75 mL/Hr) IV Continuous <Continuous>    MEDICATIONS  (PRN):      __________________________________________________  REVIEW OF SYSTEMS:    CONSTITUTIONAL: No fever,   EYES: no acute visual disturbances  NECK: No pain or stiffness  RESPIRATORY: No cough; No shortness of breath  CARDIOVASCULAR: No chest pain, no palpitations  GASTROINTESTINAL: No pain. No nausea or vomiting; No diarrhea   NEUROLOGICAL: No headache or numbness, no tremors  MUSCULOSKELETAL: No joint pain, no muscle pain  GENITOURINARY: no dysuria, no frequency, no hesitancy  PSYCHIATRY: no depression , no anxiety  ALL OTHER  ROS negative        Vital Signs Last 24 Hrs  T(C): 37.1 (2018 14:13), Max: 37.2 (2018 19:07)  T(F): 98.8 (2018 14:13), Max: 99 (2018 19:07)  HR: 50 (2018 14:13) (50 - 60)  BP: 126/69 (2018 14:13) (116/58 - 142/97)  BP(mean): --  RR: 16 (2018 14:13) (15 - 18)  SpO2: 100% (2018 14:13) (92% - 100%)    ________________________________________________  PHYSICAL EXAM:  GENERAL: NAD  HEENT: Normocephalic;  conjunctivae and sclerae clear; moist mucous membranes;   NECK : supple  CHEST/LUNG: Clear to auscultation bilaterally with good air entry   HEART: S1 S2  regular; no murmurs, gallops or rubs  ABDOMEN: Soft, Nontender, Nondistended; Bowel sounds present  EXTREMITIES: no cyanosis; no edema; no calf tenderness  SKIN: warm and dry; no rash  NERVOUS SYSTEM:  Awake and alert; Oriented  to place, person and time ; no new deficits    _________________________________________________  LABS:                        12.3   6.4   )-----------( 188      ( 2018 07:00 )             35.0     04-    142  |  106  |  20<H>  ----------------------------<  81  3.9   |  28  |  0.85    Ca    7.5<L>      2018 07:00    TPro  8.7<H>  /  Alb  3.7  /  TBili  0.5  /  DBili  x   /  AST  18  /  ALT  17  /  AlkPhos  77  04-18      Urinalysis Basic - ( 2018 14:32 )    Color: Yellow / Appearance: Clear / S.010 / pH: x  Gluc: x / Ketone: Negative  / Bili: Negative / Urobili: Negative   Blood: x / Protein: Negative / Nitrite: Negative   Leuk Esterase: Trace / RBC: 0-2 /HPF / WBC 0-2 /HPF   Sq Epi: x / Non Sq Epi: Few /HPF / Bacteria: Trace /HPF      CAPILLARY BLOOD GLUCOSE            RADIOLOGY & ADDITIONAL TESTS:    Imaging Personally Reviewed:  YES    Consultant(s) Notes Reviewed:   YES    Care Discussed with Consultants :     Plan of care was discussed with patient and /or primary care giver; all questions and concerns were addressed and care was aligned with patient's wishes.

## 2018-04-22 VITALS
SYSTOLIC BLOOD PRESSURE: 119 MMHG | HEART RATE: 62 BPM | DIASTOLIC BLOOD PRESSURE: 67 MMHG | TEMPERATURE: 98 F | OXYGEN SATURATION: 98 % | RESPIRATION RATE: 18 BRPM

## 2018-04-22 PROCEDURE — 97530 THERAPEUTIC ACTIVITIES: CPT

## 2018-04-22 PROCEDURE — 99285 EMERGENCY DEPT VISIT HI MDM: CPT | Mod: 25

## 2018-04-22 PROCEDURE — 96374 THER/PROPH/DIAG INJ IV PUSH: CPT

## 2018-04-22 PROCEDURE — 84484 ASSAY OF TROPONIN QUANT: CPT

## 2018-04-22 PROCEDURE — 80053 COMPREHEN METABOLIC PANEL: CPT

## 2018-04-22 PROCEDURE — 85027 COMPLETE CBC AUTOMATED: CPT

## 2018-04-22 PROCEDURE — 97162 PT EVAL MOD COMPLEX 30 MIN: CPT

## 2018-04-22 PROCEDURE — 97116 GAIT TRAINING THERAPY: CPT

## 2018-04-22 PROCEDURE — 80048 BASIC METABOLIC PNL TOTAL CA: CPT

## 2018-04-22 PROCEDURE — 93005 ELECTROCARDIOGRAM TRACING: CPT

## 2018-04-22 PROCEDURE — 70450 CT HEAD/BRAIN W/O DYE: CPT

## 2018-04-22 PROCEDURE — 97110 THERAPEUTIC EXERCISES: CPT

## 2018-04-22 PROCEDURE — 81001 URINALYSIS AUTO W/SCOPE: CPT

## 2018-04-22 RX ADMIN — LISINOPRIL 20 MILLIGRAM(S): 2.5 TABLET ORAL at 05:42

## 2018-04-22 RX ADMIN — Medication 0.5 MILLIGRAM(S): at 05:42

## 2018-04-22 RX ADMIN — Medication 50 MILLIGRAM(S): at 05:42

## 2018-04-22 RX ADMIN — Medication 100 MICROGRAM(S): at 05:42

## 2018-07-20 NOTE — DISCHARGE NOTE ADULT - REASON FOR ADMISSION
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 9% blasts.   - persistent disease post 7+3 induction. 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    - had BMBX on 7/13/18, without evidence of CML; BCR ABL p210 0.5% (c/w MRD)  - AML FISH negative  - NGS negative from first repeat BMBX  - ECHO with 63% EF  - HIV negative, Hepatitis negative, G6PD wnl  - HLA typing hi and lo completed  - referral information for transplant benefits given to patient  - patient has 1 full sibling, contact info given to transplant coordinators  - pt still needs to see PCP to get formal referral; appt set up with PCP Monday, July 23 in [place]. PCP should be able to refer back to us for services. --> will move appt to Wednesday 7/25/18 due to anticipated later discharge.  - consider sending BCR/ABL mutational analysis testing when WBC increases   s/p fall

## 2019-04-18 NOTE — ED ADULT NURSE NOTE - CARDIO ASSESSMENT
---
bilateral UE Passive ROM was WFL  (within functional limits)/LUE AROM shoulder flexion grossly 0-90, LUE AROM distally to shoulder WFL/Right UE Active ROM was WFL (within functional limits)/Right UE Passive ROM was WFL  (within functional limits)

## 2020-07-12 NOTE — PATIENT PROFILE ADULT. - SURGICAL SITE INCISION
Pt is a 58 year old female with PMHx of obesity, HTN, vertigo, cervical herniated disc, manic depression, fatty liver, GERD, heart murmur, DVT, PE, Von Willebrand disease, hypothyroidism, ventricular tachycardia, s/p gastric bypass presents to the ED c/o abd pain. Pain described as 10/10, pressure in general abd region. Endorses nausea, bloating, vomiting. GI: Dr. Rees, performed bypass revision, 6/23/20. Yesterday, pt c/o stomach spasms, vomiting. + bowel movement this morning, pt states she has been having black stools x6 months. Pt offers no other complaints at this time. Denies fever/cp/sob/n/v/d/c. Vitals stable
no

## 2020-12-07 NOTE — ED PROVIDER NOTE - NEUROLOGICAL, MLM
Alert and oriented, no focal deficits, no motor or sensory deficits. Thalidomide Counseling: I discussed with the patient the risks of thalidomide including but not limited to birth defects, anxiety, weakness, chest pain, dizziness, cough and severe allergy.

## 2021-04-29 NOTE — PHYSICAL THERAPY INITIAL EVALUATION ADULT - GAIT DISTANCE, PT EVAL
150 feet Hide Additional Notes?: No Detail Level: Simple Additional Notes (Optional): - Recommend OTC Dermend fragile skin moisturizing formula.

## 2021-04-29 NOTE — ED PROVIDER NOTE - NS ED SCRIBE STATEMENT
Consent: Written consent was obtained and risk was reviewed  per signed  consent form. Biopsy was performed with patient verbal permission. Attending

## 2025-01-23 NOTE — PATIENT PROFILE ADULT. - ALCOHOL USE HISTORY SINGLE SELECT
yes... Detail Level: Zone Render In Strict Bullet Format?: No Modify Regimen: Efudex Twice a day for 4 weeks to the scalp after lesions treated with liquid nitrogen peel off

## 2025-02-11 NOTE — H&P ADULT - NEGATIVE GENERAL SYMPTOMS
no chills/no fever Instructions: This plan will send the code FBSE to the PM system.  DO NOT or CHANGE the price. Detail Level: Simple Price (Do Not Change): 0.00

## 2025-05-09 NOTE — H&P ADULT. - SKIN
Increase dietary protein   Keep up hydration    Hydrating lotion to the site to hydrate skin - wash hands thoroughly after use       detailed exam warm and dry